# Patient Record
Sex: FEMALE | Race: WHITE | Employment: UNEMPLOYED | URBAN - METROPOLITAN AREA
[De-identification: names, ages, dates, MRNs, and addresses within clinical notes are randomized per-mention and may not be internally consistent; named-entity substitution may affect disease eponyms.]

---

## 2022-04-27 ENCOUNTER — OFFICE VISIT (OUTPATIENT)
Dept: PRIMARY CARE CLINIC | Age: 52
End: 2022-04-27
Payer: MEDICAID

## 2022-04-27 VITALS
SYSTOLIC BLOOD PRESSURE: 150 MMHG | BODY MASS INDEX: 40.35 KG/M2 | OXYGEN SATURATION: 99 % | HEIGHT: 67 IN | DIASTOLIC BLOOD PRESSURE: 88 MMHG | WEIGHT: 257.1 LBS | TEMPERATURE: 96.8 F | HEART RATE: 94 BPM

## 2022-04-27 DIAGNOSIS — H61.23 BILATERAL IMPACTED CERUMEN: ICD-10-CM

## 2022-04-27 DIAGNOSIS — Z00.8 ENCOUNTER FOR ELECTROCARDIOGRAM: ICD-10-CM

## 2022-04-27 DIAGNOSIS — H81.22 VESTIBULAR NEURONITIS OF LEFT EAR: ICD-10-CM

## 2022-04-27 DIAGNOSIS — Z12.11 COLON CANCER SCREENING: ICD-10-CM

## 2022-04-27 DIAGNOSIS — Z87.442 HISTORY OF NEPHROLITHIASIS: ICD-10-CM

## 2022-04-27 DIAGNOSIS — I10 BENIGN ESSENTIAL HTN: Primary | ICD-10-CM

## 2022-04-27 DIAGNOSIS — Z13.220 LIPID SCREENING: ICD-10-CM

## 2022-04-27 DIAGNOSIS — Z12.31 BREAST CANCER SCREENING BY MAMMOGRAM: ICD-10-CM

## 2022-04-27 PROBLEM — H61.20 CERUMEN IMPACTION: Status: ACTIVE | Noted: 2022-04-27

## 2022-04-27 PROBLEM — H81.20 VESTIBULAR NEURITIS: Status: ACTIVE | Noted: 2022-04-27

## 2022-04-27 PROCEDURE — G8417 CALC BMI ABV UP PARAM F/U: HCPCS | Performed by: INTERNAL MEDICINE

## 2022-04-27 PROCEDURE — 3017F COLORECTAL CA SCREEN DOC REV: CPT | Performed by: INTERNAL MEDICINE

## 2022-04-27 PROCEDURE — 1036F TOBACCO NON-USER: CPT | Performed by: INTERNAL MEDICINE

## 2022-04-27 PROCEDURE — 93000 ELECTROCARDIOGRAM COMPLETE: CPT | Performed by: INTERNAL MEDICINE

## 2022-04-27 PROCEDURE — G8427 DOCREV CUR MEDS BY ELIG CLIN: HCPCS | Performed by: INTERNAL MEDICINE

## 2022-04-27 PROCEDURE — 99205 OFFICE O/P NEW HI 60 MIN: CPT | Performed by: INTERNAL MEDICINE

## 2022-04-27 SDOH — ECONOMIC STABILITY: FOOD INSECURITY: WITHIN THE PAST 12 MONTHS, THE FOOD YOU BOUGHT JUST DIDN'T LAST AND YOU DIDN'T HAVE MONEY TO GET MORE.: NEVER TRUE

## 2022-04-27 SDOH — ECONOMIC STABILITY: FOOD INSECURITY: WITHIN THE PAST 12 MONTHS, YOU WORRIED THAT YOUR FOOD WOULD RUN OUT BEFORE YOU GOT MONEY TO BUY MORE.: NEVER TRUE

## 2022-04-27 ASSESSMENT — ANXIETY QUESTIONNAIRES
1. FEELING NERVOUS, ANXIOUS, OR ON EDGE: 0
GAD7 TOTAL SCORE: 0
6. BECOMING EASILY ANNOYED OR IRRITABLE: 0
5. BEING SO RESTLESS THAT IT IS HARD TO SIT STILL: 0
7. FEELING AFRAID AS IF SOMETHING AWFUL MIGHT HAPPEN: 0
IF YOU CHECKED OFF ANY PROBLEMS ON THIS QUESTIONNAIRE, HOW DIFFICULT HAVE THESE PROBLEMS MADE IT FOR YOU TO DO YOUR WORK, TAKE CARE OF THINGS AT HOME, OR GET ALONG WITH OTHER PEOPLE: NOT DIFFICULT AT ALL
4. TROUBLE RELAXING: 0
3. WORRYING TOO MUCH ABOUT DIFFERENT THINGS: 0
2. NOT BEING ABLE TO STOP OR CONTROL WORRYING: 0

## 2022-04-27 ASSESSMENT — PATIENT HEALTH QUESTIONNAIRE - PHQ9
SUM OF ALL RESPONSES TO PHQ QUESTIONS 1-9: 0
2. FEELING DOWN, DEPRESSED OR HOPELESS: 0
1. LITTLE INTEREST OR PLEASURE IN DOING THINGS: 0
SUM OF ALL RESPONSES TO PHQ QUESTIONS 1-9: 0
SUM OF ALL RESPONSES TO PHQ9 QUESTIONS 1 & 2: 0
SUM OF ALL RESPONSES TO PHQ QUESTIONS 1-9: 0
SUM OF ALL RESPONSES TO PHQ QUESTIONS 1-9: 0

## 2022-04-27 ASSESSMENT — SOCIAL DETERMINANTS OF HEALTH (SDOH): HOW HARD IS IT FOR YOU TO PAY FOR THE VERY BASICS LIKE FOOD, HOUSING, MEDICAL CARE, AND HEATING?: NOT HARD AT ALL

## 2022-04-27 NOTE — PROGRESS NOTES
HPI:  The patient is a 47 y.o. female who presents today to establish care. She has a past medical history of nephrolithiasis with a carotid calcium renal stones requiring lithotripsy sometimes over the life. She also had an episode of vestibular neuritis back in January where she ended up with a severe left ear infection and had to go through physical therapy. She feels a lot better now with complete resolution of that episode and at that time it was thought to be secondary to COVID infection but she was not tested at that time. The patient complains of bilateral ear itching for the last few weeks. On exam she had some cerumen that is dry on both sides and she was advised to use the kit over-the-counter and if no success we will flush it for her. She is actually seeing ENT in 2 weeks. She is up-to-date with her mammogram and pelvic exam she is seeing gynecology in a week  She also have a strong family history of coronary artery disease and colon cancer in her dad at the age of 76-77 and she has been doing her colonoscopy every 5 years. She denies any current shortness of breath chest pains abdominal pains nausea vomiting or diarrhea. She did not have any PCP and she used to go to the urgent care when she has an acute event. .  Blood pressure today is mildly elevated but we will recheck it again next visit to avoid white coat syndrome effect. Past Medical History:   Diagnosis Date    Benign essential HTN 4/27/2022    Kidney stone       Past Surgical History:   Procedure Laterality Date    APPENDECTOMY        No family history on file.   Social History     Socioeconomic History    Marital status: Single     Spouse name: Not on file    Number of children: Not on file    Years of education: Not on file    Highest education level: Not on file   Occupational History    Not on file   Tobacco Use    Smoking status: Never Smoker    Smokeless tobacco: Never Used   Vaping Use    Vaping Use: Never used Substance and Sexual Activity    Alcohol use: No    Drug use: No    Sexual activity: Never   Other Topics Concern    Not on file   Social History Narrative    Not on file     Social Determinants of Health     Financial Resource Strain: Low Risk     Difficulty of Paying Living Expenses: Not hard at all   Food Insecurity: No Food Insecurity    Worried About Running Out of Food in the Last Year: Never true    Kun of Food in the Last Year: Never true   Transportation Needs:     Lack of Transportation (Medical): Not on file    Lack of Transportation (Non-Medical):  Not on file   Physical Activity:     Days of Exercise per Week: Not on file    Minutes of Exercise per Session: Not on file   Stress:     Feeling of Stress : Not on file   Social Connections:     Frequency of Communication with Friends and Family: Not on file    Frequency of Social Gatherings with Friends and Family: Not on file    Attends Spiritism Services: Not on file    Active Member of [address] [place] [address] or Organizations: Not on file    Attends Club or Organization Meetings: Not on file    Marital Status: Not on file   Intimate Partner Violence:     Fear of Current or Ex-Partner: Not on file    Emotionally Abused: Not on file    Physically Abused: Not on file    Sexually Abused: Not on file   Housing Stability:     Unable to Pay for Housing in the Last Year: Not on file    Number of [place] in the Last Year: Not on file    Unstable Housing in the Last Year: Not on file      Allergies   Allergen Reactions    Medrol [Methylprednisolone] Rash    Sulfa Antibiotics Rash        Review of Systems:  Constitutional:  No fever, no fatigue, no chills, no headaches, no weight change  Dermatology:  No rash, no mole, no dry or sensitive skin  ENT:  No cough, no sore throat, no sinus pain, no runny nose, no ear pain  Cardiology:  No chest pain, no palpitations, no leg edema, no shortness of breath, no PND  Endocrinology:  No polydipsia, no polyuria, no cold intolerance, no heat intolerance, no polyphagia, no hair changes  Gastroenterology:  No dysphagia, no abdominal pain, no nausea, no vomiting, no constipation, no diarrhea, no heartburn  Female Reproductive:  No hot flashes, no abnormal vaginal discharge, no pain with menstruation, no pelvic pain  Musculoskeletal:  No joint pain, no leg cramps, no back pain, no muscle aches  Respiratory:  No shortness of breath, no orthopnea, no wheezing, no MOE, no hemoptysis  Urology:  No blood in the urine, no urinary frequency, no urinary incontinence, no urinary urgency, no nocturia, no dysuria  Neurology:  No numbness/tingling, no dizziness, no weakness  Psychology:  No depression, no sleep disturbances, no suicidal ideation, no anxiety      PHYSICAL EXAM:    Vitals:  BP (!) 150/88   Pulse 94   Temp 96.8 °F (36 °C) (Temporal)   Ht 5' 7\" (1.702 m)   Wt 257 lb 1.6 oz (116.6 kg)   SpO2 99%   Breastfeeding No   BMI 40.27 kg/m²     General:  Awake, alert, oriented X 3. Well developed, well nourished, well groomed. No apparent distress. HEENT:  Normocephalic, atraumatic. Pupils equal, round, reactive to light. No scleral icterus. No conjunctival injection. Normal lips, teeth, and gums. No nasal discharge. Neck:  Supple  Heart:  RRR, no murmurs, gallops, or rubs  Lungs:  CTA bilaterally, bilat symmetrical expansion, no wheeze, rales, or rhonchi  Abdomen: Bowel sounds present, soft, nontender, no masses, no organomegaly, no peritoneal signs  Extremities:  No clubbing, cyanosis, or edema  Skin:  Warm and dry, no open lesions or rash  Neuro:  Cranial nerves 2-12 intact, no focal deficits  Breast: symmetrical,  no masses, no skin lesions, no peu d'orange, no axillary lymph nodes, no nipple discharge  Rectal: no masses, GUIAC negative stools, positive hemorrhoids  Genitalia:  no vaginal discharge, no rashes, no inguinal lymph nodes    Assessment & Plan:  1.  Benign essential HTN  -     CBC with Auto Differential; Future  -     Comprehensive Metabolic Panel; Future  -     Urinalysis; Future  2. Encounter for electrocardiogram  -     EKG 12 Lead  3. History of nephrolithiasis  -     Vitamin D 25 Hydroxy; Future  -     PTH, Intact; Future  4. Vestibular neuronitis of left ear  -     Vitamin D 25 Hydroxy; Future  5. Bilateral impacted cerumen  6. Colon cancer screening  -     External Referral To Gastroenterology  7. Lipid screening  -     Lipid Panel; Future  -     TSH; Future  8. Breast cancer screening by mammogram  -     ROSEMARIE DIGITAL SCREEN W OR WO CAD BILATERAL; Future  It took about 60 minutes for face-to-face interview with the patient and examining her and going over all the necessary imaging and lab studies.   We will check her for hyperparathyroidism in the presence of the history of significant nephrolithiasis

## 2022-05-17 DIAGNOSIS — Z87.442 HISTORY OF NEPHROLITHIASIS: ICD-10-CM

## 2022-05-17 DIAGNOSIS — I10 BENIGN ESSENTIAL HTN: ICD-10-CM

## 2022-05-17 DIAGNOSIS — H81.22 VESTIBULAR NEURONITIS OF LEFT EAR: ICD-10-CM

## 2022-05-17 DIAGNOSIS — Z13.220 LIPID SCREENING: ICD-10-CM

## 2022-05-17 LAB
ALBUMIN SERPL-MCNC: 4.7 G/DL (ref 3.5–5.2)
ALP BLD-CCNC: 64 U/L (ref 35–104)
ALT SERPL-CCNC: 16 U/L (ref 0–32)
ANION GAP SERPL CALCULATED.3IONS-SCNC: 14 MMOL/L (ref 7–16)
AST SERPL-CCNC: 21 U/L (ref 0–31)
BACTERIA: NORMAL /HPF
BASOPHILS ABSOLUTE: 0.04 E9/L (ref 0–0.2)
BASOPHILS RELATIVE PERCENT: 0.6 % (ref 0–2)
BILIRUB SERPL-MCNC: 1.1 MG/DL (ref 0–1.2)
BILIRUBIN URINE: NEGATIVE
BLOOD, URINE: NEGATIVE
BUN BLDV-MCNC: 10 MG/DL (ref 6–20)
CALCIUM SERPL-MCNC: 10.5 MG/DL (ref 8.6–10.2)
CHLORIDE BLD-SCNC: 105 MMOL/L (ref 98–107)
CHOLESTEROL, TOTAL: 198 MG/DL (ref 0–199)
CLARITY: ABNORMAL
CO2: 23 MMOL/L (ref 22–29)
COLOR: YELLOW
CREAT SERPL-MCNC: 0.8 MG/DL (ref 0.5–1)
EOSINOPHILS ABSOLUTE: 0.19 E9/L (ref 0.05–0.5)
EOSINOPHILS RELATIVE PERCENT: 3 % (ref 0–6)
GFR AFRICAN AMERICAN: >60
GFR NON-AFRICAN AMERICAN: >60 ML/MIN/1.73
GLUCOSE BLD-MCNC: 98 MG/DL (ref 74–99)
GLUCOSE URINE: NEGATIVE MG/DL
HCT VFR BLD CALC: 47.4 % (ref 34–48)
HDLC SERPL-MCNC: 40 MG/DL
HEMOGLOBIN: 14.6 G/DL (ref 11.5–15.5)
IMMATURE GRANULOCYTES #: 0.02 E9/L
IMMATURE GRANULOCYTES %: 0.3 % (ref 0–5)
KETONES, URINE: NEGATIVE MG/DL
LDL CHOLESTEROL CALCULATED: 133 MG/DL (ref 0–99)
LEUKOCYTE ESTERASE, URINE: ABNORMAL
LYMPHOCYTES ABSOLUTE: 1.53 E9/L (ref 1.5–4)
LYMPHOCYTES RELATIVE PERCENT: 24.2 % (ref 20–42)
MCH RBC QN AUTO: 29.1 PG (ref 26–35)
MCHC RBC AUTO-ENTMCNC: 30.8 % (ref 32–34.5)
MCV RBC AUTO: 94.4 FL (ref 80–99.9)
MONOCYTES ABSOLUTE: 0.59 E9/L (ref 0.1–0.95)
MONOCYTES RELATIVE PERCENT: 9.3 % (ref 2–12)
NEUTROPHILS ABSOLUTE: 3.96 E9/L (ref 1.8–7.3)
NEUTROPHILS RELATIVE PERCENT: 62.6 % (ref 43–80)
NITRITE, URINE: NEGATIVE
PARATHYROID HORMONE INTACT: 74 PG/ML (ref 15–65)
PDW BLD-RTO: 13.8 FL (ref 11.5–15)
PH UA: 7 (ref 5–9)
PLATELET # BLD: 297 E9/L (ref 130–450)
PMV BLD AUTO: 11 FL (ref 7–12)
POTASSIUM SERPL-SCNC: 5 MMOL/L (ref 3.5–5)
PROTEIN UA: NEGATIVE MG/DL
RBC # BLD: 5.02 E12/L (ref 3.5–5.5)
RBC UA: NORMAL /HPF (ref 0–2)
SODIUM BLD-SCNC: 142 MMOL/L (ref 132–146)
SPECIFIC GRAVITY UA: 1.01 (ref 1–1.03)
TOTAL PROTEIN: 6.9 G/DL (ref 6.4–8.3)
TRIGL SERPL-MCNC: 123 MG/DL (ref 0–149)
TSH SERPL DL<=0.05 MIU/L-ACNC: 2.31 UIU/ML (ref 0.27–4.2)
UROBILINOGEN, URINE: 0.2 E.U./DL
VITAMIN D 25-HYDROXY: 10 NG/ML (ref 30–100)
VLDLC SERPL CALC-MCNC: 25 MG/DL
WBC # BLD: 6.3 E9/L (ref 4.5–11.5)
WBC UA: NORMAL /HPF (ref 0–5)

## 2022-05-18 LAB — PAP SMEAR, EXTERNAL: NORMAL

## 2022-05-20 ENCOUNTER — HOSPITAL ENCOUNTER (OUTPATIENT)
Dept: MAMMOGRAPHY | Age: 52
Discharge: HOME | End: 2022-05-22
Payer: MEDICAID

## 2022-05-20 VITALS — HEIGHT: 68 IN | WEIGHT: 240 LBS | BODY MASS INDEX: 36.37 KG/M2

## 2022-05-20 DIAGNOSIS — Z12.31 BREAST CANCER SCREENING BY MAMMOGRAM: ICD-10-CM

## 2022-05-20 PROCEDURE — 77063 BREAST TOMOSYNTHESIS BI: CPT

## 2022-05-25 ENCOUNTER — OFFICE VISIT (OUTPATIENT)
Dept: PRIMARY CARE CLINIC | Age: 52
End: 2022-05-25
Payer: MEDICAID

## 2022-05-25 VITALS
WEIGHT: 252.1 LBS | HEART RATE: 84 BPM | DIASTOLIC BLOOD PRESSURE: 78 MMHG | HEIGHT: 68 IN | SYSTOLIC BLOOD PRESSURE: 128 MMHG | OXYGEN SATURATION: 93 % | TEMPERATURE: 97.3 F | BODY MASS INDEX: 38.21 KG/M2

## 2022-05-25 DIAGNOSIS — H81.20 VESTIBULAR NEURONITIS, UNSPECIFIED LATERALITY: ICD-10-CM

## 2022-05-25 DIAGNOSIS — Z87.442 HISTORY OF NEPHROLITHIASIS: ICD-10-CM

## 2022-05-25 DIAGNOSIS — E34.9 ELEVATED PARATHYROID HORMONE: ICD-10-CM

## 2022-05-25 DIAGNOSIS — E83.52 HYPERCALCEMIA: ICD-10-CM

## 2022-05-25 DIAGNOSIS — E55.9 VITAMIN D DEFICIENCY: Primary | ICD-10-CM

## 2022-05-25 PROBLEM — R79.89 ELEVATED PARATHYROID HORMONE: Status: ACTIVE | Noted: 2022-05-25

## 2022-05-25 PROCEDURE — 99214 OFFICE O/P EST MOD 30 MIN: CPT | Performed by: INTERNAL MEDICINE

## 2022-05-25 PROCEDURE — G8427 DOCREV CUR MEDS BY ELIG CLIN: HCPCS | Performed by: INTERNAL MEDICINE

## 2022-05-25 PROCEDURE — 1036F TOBACCO NON-USER: CPT | Performed by: INTERNAL MEDICINE

## 2022-05-25 PROCEDURE — G8417 CALC BMI ABV UP PARAM F/U: HCPCS | Performed by: INTERNAL MEDICINE

## 2022-05-25 PROCEDURE — 3017F COLORECTAL CA SCREEN DOC REV: CPT | Performed by: INTERNAL MEDICINE

## 2022-05-25 RX ORDER — ERGOCALCIFEROL 1.25 MG/1
50000 CAPSULE ORAL WEEKLY
Qty: 12 CAPSULE | Refills: 1 | Status: SHIPPED
Start: 2022-05-25 | End: 2022-08-16 | Stop reason: SDUPTHER

## 2022-05-25 RX ORDER — NEOMYCIN SULFATE, POLYMYXIN B SULFATE AND DEXAMETHASONE 3.5; 10000; 1 MG/ML; [USP'U]/ML; MG/ML
SUSPENSION/ DROPS OPHTHALMIC
COMMUNITY
Start: 2022-05-09 | End: 2022-06-21

## 2022-05-25 NOTE — PROGRESS NOTES
Chief Complaint   Patient presents with   Pheedo [place]     Pt is here as a 1 month F/U, to discuss labs that were drawn and on file for review. This pt. also had her Mammogram  done on 5/20 and is scheduled for a Colonoscopy in July. HPI:  Patient is here for follow-up of nephrolithiasis and blood work that was ordered and mammogram.  She is doing very well without any new complaints. Blood work was discussed with patient. Mild hypercalcemia at 10.5 g. Also mild hyperparathyroidism but very decreased vitamin D level down to 10. Patient was advised to start vitamin D prescription supplement in an attempt to correct the abnormalities in the blood work. Also blood work showed mild hyperlipidemia with an LDL of 133 rest of lab work are within normal limits. She has been encouraged to increase her water intake. Screening mammogram was within normal limits. .    Past Medical History, Surgical History, and Family History has been reviewed and updated.     Review of Systems:  Constitutional:  No fever, no fatigue, no chills, no headaches, no weight change  Dermatology:  No rash, no mole, no dry or sensitive skin  ENT:  No cough, no sore throat, no sinus pain, no runny nose, no ear pain  Cardiology:  No chest pain, no palpitations, no leg edema, no shortness of breath, no PND  Gastroenterology:  No dysphagia, no abdominal pain, no nausea, no vomiting, no constipation, no diarrhea, no heartburn  Musculoskeletal:  No joint pain, no leg cramps, no back pain, no muscle aches  Respiratory:  No shortness of breath, no orthopnea, no wheezing, no MOE, no hemoptysis  Urology:  No blood in the urine, no urinary frequency, no urinary incontinence, no urinary urgency, no nocturia, no dysuria    Vitals:    05/25/22 1121   BP: 128/78   Pulse: 84   Temp: 97.3 °F (36.3 °C)   TempSrc: Temporal   SpO2: 93%   Weight: 252 lb 1.6 oz (114.4 kg)   Height: 5' 8\" (1.727 m)       General:  Patient alert and oriented x 3, NAD, pleasant  HEENT:  Atraumatic, normocephalic, PERRLA, EOMI, clear conjunctiva, TMs clear, nose-clear, throat - no erythema  Neck:  Supple, no goiter, no carotid bruits, no LAD  Lungs:  CTA   Heart:  RRR, no murmurs, gallops or rubs  Abdomen:  Soft/nt/nd, + bowel sounds  Extremities:  No clubbing, cyanosis or edema  Skin: unremarkable    Cholesterol, Total   Date Value Ref Range Status   05/17/2022 198 0 - 199 mg/dL Final     Triglycerides   Date Value Ref Range Status   05/17/2022 123 0 - 149 mg/dL Final     HDL   Date Value Ref Range Status   05/17/2022 40 >40 mg/dL Final     LDL Calculated   Date Value Ref Range Status   05/17/2022 133 (H) 0 - 99 mg/dL Final     VLDL Cholesterol Calculated   Date Value Ref Range Status   05/17/2022 25 mg/dL Final     Sodium   Date Value Ref Range Status   05/17/2022 142 132 - 146 mmol/L Final     Potassium   Date Value Ref Range Status   05/17/2022 5.0 3.5 - 5.0 mmol/L Final     Chloride   Date Value Ref Range Status   05/17/2022 105 98 - 107 mmol/L Final     CO2   Date Value Ref Range Status   05/17/2022 23 22 - 29 mmol/L Final     BUN   Date Value Ref Range Status   05/17/2022 10 6 - 20 mg/dL Final     CREATININE   Date Value Ref Range Status   05/17/2022 0.8 0.5 - 1.0 mg/dL Final     Glucose   Date Value Ref Range Status   05/17/2022 98 74 - 99 mg/dL Final     Calcium   Date Value Ref Range Status   05/17/2022 10.5 (H) 8.6 - 10.2 mg/dL Final     Total Protein   Date Value Ref Range Status   05/17/2022 6.9 6.4 - 8.3 g/dL Final     Albumin   Date Value Ref Range Status   05/17/2022 4.7 3.5 - 5.2 g/dL Final     Total Bilirubin   Date Value Ref Range Status   05/17/2022 1.1 0.0 - 1.2 mg/dL Final     Alkaline Phosphatase   Date Value Ref Range Status   05/17/2022 64 35 - 104 U/L Final     AST   Date Value Ref Range Status   05/17/2022 21 0 - 31 U/L Final     ALT   Date Value Ref Range Status   05/17/2022 16 0 - 32 U/L Final     GFR Non-   Date Value Ref Range Status 05/17/2022 >60 >=60 mL/min/1.73 Final     Comment:     Chronic Kidney Disease: less than 60 ml/min/1.73 sq.m. Kidney Failure: less than 15 ml/min/1.73 sq.m. Results valid for patients 18 years and older. GFR    Date Value Ref Range Status   05/17/2022 >60  Final        No results found. Assessment/Plan:      Outpatient Encounter Medications as of 5/25/2022   Medication Sig Dispense Refill    neomycin-polymyxin-dexameth (MAXITROL) 3.5-13563-8.1 ophthalmic suspension INSTILL 3 DROPS INTO EACH EAR TWICE DAILY FOR 10 DAYS      vitamin D (ERGOCALCIFEROL) 1.25 MG (00741 UT) CAPS capsule Take 1 capsule by mouth once a week 12 capsule 1     No facility-administered encounter medications on file as of 5/25/2022. Chelly Leger was seen today for discuss labs. Diagnoses and all orders for this visit:    Benign essential HTN  -     Basic Metabolic Panel; Future    History of nephrolithiasis  -     PTH, Intact; Future    Vestibular neuronitis, unspecified laterality    Hypercalcemia  -     vitamin D (ERGOCALCIFEROL) 1.25 MG (02519 UT) CAPS capsule; Take 1 capsule by mouth once a week  -     Basic Metabolic Panel; Future  -     Vitamin D 25 Hydroxy; Future  -     PTH, Intact; Future         There are no Patient Instructions on file for this visit. On this date 5/25/2022 I have spent 25 minutes reviewing previous notes, test results and face to face with the patient discussing the diagnosis and importance of compliance with the treatment plan as well as documenting on the day of the visit.       Cortney Seo MD   5/25/22

## 2022-06-21 ENCOUNTER — OFFICE VISIT (OUTPATIENT)
Dept: PRIMARY CARE CLINIC | Age: 52
End: 2022-06-21
Payer: MEDICAID

## 2022-06-21 ENCOUNTER — HOSPITAL ENCOUNTER (OUTPATIENT)
Age: 52
Discharge: HOME | End: 2022-06-23
Payer: MEDICAID

## 2022-06-21 ENCOUNTER — HOSPITAL ENCOUNTER (OUTPATIENT)
Dept: GENERAL RADIOLOGY | Age: 52
Discharge: HOME | End: 2022-06-23
Payer: MEDICAID

## 2022-06-21 VITALS
WEIGHT: 250.2 LBS | DIASTOLIC BLOOD PRESSURE: 80 MMHG | SYSTOLIC BLOOD PRESSURE: 122 MMHG | TEMPERATURE: 97 F | OXYGEN SATURATION: 99 % | BODY MASS INDEX: 37.92 KG/M2 | HEART RATE: 78 BPM | HEIGHT: 68 IN

## 2022-06-21 DIAGNOSIS — E55.9 VITAMIN D DEFICIENCY: ICD-10-CM

## 2022-06-21 DIAGNOSIS — Z87.442 HISTORY OF NEPHROLITHIASIS: ICD-10-CM

## 2022-06-21 DIAGNOSIS — N30.01 HEMATURIA DUE TO ACUTE CYSTITIS: ICD-10-CM

## 2022-06-21 DIAGNOSIS — N30.01 HEMATURIA DUE TO ACUTE CYSTITIS: Primary | ICD-10-CM

## 2022-06-21 LAB
BILIRUBIN, POC: NORMAL
BLOOD URINE, POC: NORMAL
CLARITY, POC: CLEAR
COLOR, POC: YELLOW
GLUCOSE URINE, POC: NORMAL
KETONES, POC: NORMAL
LEUKOCYTE EST, POC: NORMAL
NITRITE, POC: NORMAL
PH, POC: 6.5
PROTEIN, POC: NORMAL
SPECIFIC GRAVITY, POC: 1.02
UROBILINOGEN, POC: NORMAL

## 2022-06-21 PROCEDURE — 1036F TOBACCO NON-USER: CPT | Performed by: INTERNAL MEDICINE

## 2022-06-21 PROCEDURE — G8427 DOCREV CUR MEDS BY ELIG CLIN: HCPCS | Performed by: INTERNAL MEDICINE

## 2022-06-21 PROCEDURE — G8417 CALC BMI ABV UP PARAM F/U: HCPCS | Performed by: INTERNAL MEDICINE

## 2022-06-21 PROCEDURE — 74018 RADEX ABDOMEN 1 VIEW: CPT

## 2022-06-21 PROCEDURE — 99213 OFFICE O/P EST LOW 20 MIN: CPT | Performed by: INTERNAL MEDICINE

## 2022-06-21 PROCEDURE — 81002 URINALYSIS NONAUTO W/O SCOPE: CPT | Performed by: INTERNAL MEDICINE

## 2022-06-21 PROCEDURE — 3017F COLORECTAL CA SCREEN DOC REV: CPT | Performed by: INTERNAL MEDICINE

## 2022-06-21 RX ORDER — CIPROFLOXACIN 500 MG/1
500 TABLET, FILM COATED ORAL 2 TIMES DAILY
Qty: 14 TABLET | Refills: 0 | Status: SHIPPED | OUTPATIENT
Start: 2022-06-21 | End: 2022-06-28

## 2022-06-21 NOTE — PROGRESS NOTES
Chief Complaint   Patient presents with    Urinary Tract Infection     Pt is here as an acute visit with c/o UTI symptoms and a U/A obtained today. Pt has burning and dysuria x1 week, and states she is prone to kidney stones and also has low back discomfort. HPI:  Patient is here  complaining of left-sided flank pain radiated to the left lower abdominal quadrant. She has been having that for about 2 to 3 days getting progressively worse. She denies any fevers or chills any nausea or vomiting. She had a history of kidney stones and she has been seeing her urologist once a year in September. According to her last time she had to renal stones on the left side that were small in size and she does not know whether she passed them or not. Today her UA showed large blood with trace leukocyte gloria. She has been having some frequency of urination and dysuria lately. She did not have the blood work done yet but she has been compliant with vitamin D weekly. Past Medical History, Surgical History, and Family History has been reviewed and updated.     Review of Systems:  Constitutional:  No fever, no fatigue, no chills, no headaches, no weight change  Dermatology:  No rash, no mole, no dry or sensitive skin  ENT:  No cough, no sore throat, no sinus pain, no runny nose, no ear pain  Cardiology:  No chest pain, no palpitations, no leg edema, no shortness of breath, no PND  Gastroenterology:  No dysphagia, no abdominal pain, no nausea, no vomiting, no constipation, no diarrhea, no heartburn  Musculoskeletal:  No joint pain, no leg cramps, no back pain, no muscle aches  Respiratory:  No shortness of breath, no orthopnea, no wheezing, no MOE, no hemoptysis  Urology:  No blood in the urine, no urinary frequency, no urinary incontinence, no urinary urgency, no nocturia, no dysuria    Vitals:    06/21/22 1137   BP: 122/80   Pulse: 78   Temp: 97 °F (36.1 °C)   TempSrc: Temporal   SpO2: 99%   Weight: 250 lb 3.2 oz (113.5 kg)   Height: 5' 8\" (1.727 m)       General:  Patient alert and oriented x 3, NAD, pleasant  HEENT:  Atraumatic, normocephalic, PERRLA, EOMI, clear conjunctiva, TMs clear, nose-clear, throat - no erythema  Neck:  Supple, no goiter, no carotid bruits, no LAD  Lungs:  CTA   Heart:  RRR, no murmurs, gallops or rubs  Abdomen:  Soft/nt/nd, + bowel sounds, mild suprapubic discomfort  Left flank: Positive mild tenderness radiating to the left lower abdominal area  Extremities:  No clubbing, cyanosis or edema  Skin: unremarkable    Cholesterol, Total   Date Value Ref Range Status   05/17/2022 198 0 - 199 mg/dL Final     Triglycerides   Date Value Ref Range Status   05/17/2022 123 0 - 149 mg/dL Final     HDL   Date Value Ref Range Status   05/17/2022 40 >40 mg/dL Final     LDL Calculated   Date Value Ref Range Status   05/17/2022 133 (H) 0 - 99 mg/dL Final     VLDL Cholesterol Calculated   Date Value Ref Range Status   05/17/2022 25 mg/dL Final     Sodium   Date Value Ref Range Status   05/17/2022 142 132 - 146 mmol/L Final     Potassium   Date Value Ref Range Status   05/17/2022 5.0 3.5 - 5.0 mmol/L Final     Chloride   Date Value Ref Range Status   05/17/2022 105 98 - 107 mmol/L Final     CO2   Date Value Ref Range Status   05/17/2022 23 22 - 29 mmol/L Final     BUN   Date Value Ref Range Status   05/17/2022 10 6 - 20 mg/dL Final     CREATININE   Date Value Ref Range Status   05/17/2022 0.8 0.5 - 1.0 mg/dL Final     Glucose   Date Value Ref Range Status   05/17/2022 98 74 - 99 mg/dL Final     Calcium   Date Value Ref Range Status   05/17/2022 10.5 (H) 8.6 - 10.2 mg/dL Final     Total Protein   Date Value Ref Range Status   05/17/2022 6.9 6.4 - 8.3 g/dL Final     Albumin   Date Value Ref Range Status   05/17/2022 4.7 3.5 - 5.2 g/dL Final     Total Bilirubin   Date Value Ref Range Status   05/17/2022 1.1 0.0 - 1.2 mg/dL Final     Alkaline Phosphatase   Date Value Ref Range Status   05/17/2022 64 35 - 104 U/L Final     AST Date Value Ref Range Status   05/17/2022 21 0 - 31 U/L Final     ALT   Date Value Ref Range Status   05/17/2022 16 0 - 32 U/L Final     GFR Non-   Date Value Ref Range Status   05/17/2022 >60 >=60 mL/min/1.73 Final     Comment:     Chronic Kidney Disease: less than 60 ml/min/1.73 sq.m. Kidney Failure: less than 15 ml/min/1.73 sq.m. Results valid for patients 18 years and older. GFR    Date Value Ref Range Status   05/17/2022 >60  Final        No results found. Assessment/Plan:      Outpatient Encounter Medications as of 6/21/2022   Medication Sig Dispense Refill    ciprofloxacin (CIPRO) 500 MG tablet Take 1 tablet by mouth 2 times daily for 7 days 14 tablet 0    vitamin D (ERGOCALCIFEROL) 1.25 MG (97698 UT) CAPS capsule Take 1 capsule by mouth once a week 12 capsule 1    [DISCONTINUED] neomycin-polymyxin-dexameth (MAXITROL) 3.5-67237-3.1 ophthalmic suspension INSTILL 3 DROPS INTO EACH EAR TWICE DAILY FOR 10 DAYS       No facility-administered encounter medications on file as of 6/21/2022. Crispin Quarles was seen today for urinary tract infection. Diagnoses and all orders for this visit:    Hematuria due to acute cystitis  -     ciprofloxacin (CIPRO) 500 MG tablet; Take 1 tablet by mouth 2 times daily for 7 days  -     XR ABDOMEN (KUB) (SINGLE AP VIEW); Future    Vitamin D deficiency    History of nephrolithiasis  -     POCT Urinalysis no Micro         There are no Patient Instructions on file for this visit. On this date 6/21/2022 I have spent 25 minutes reviewing previous notes, test results and face to face with the patient discussing the diagnosis and importance of compliance with the treatment plan as well as documenting on the day of the visit.       Justin Hollingsworth MD   6/21/22

## 2022-06-29 DIAGNOSIS — Z87.442 HISTORY OF NEPHROLITHIASIS: ICD-10-CM

## 2022-06-29 DIAGNOSIS — E83.52 HYPERCALCEMIA: ICD-10-CM

## 2022-06-29 LAB
ANION GAP SERPL CALCULATED.3IONS-SCNC: 10 MMOL/L (ref 7–16)
BUN BLDV-MCNC: 11 MG/DL (ref 6–20)
CALCIUM SERPL-MCNC: 9.7 MG/DL (ref 8.6–10.2)
CHLORIDE BLD-SCNC: 105 MMOL/L (ref 98–107)
CO2: 25 MMOL/L (ref 22–29)
CREAT SERPL-MCNC: 0.7 MG/DL (ref 0.5–1)
GFR AFRICAN AMERICAN: >60
GFR NON-AFRICAN AMERICAN: >60 ML/MIN/1.73
GLUCOSE BLD-MCNC: 99 MG/DL (ref 74–99)
PARATHYROID HORMONE INTACT: 101 PG/ML (ref 15–65)
POTASSIUM SERPL-SCNC: 4.1 MMOL/L (ref 3.5–5)
SODIUM BLD-SCNC: 140 MMOL/L (ref 132–146)
VITAMIN D 25-HYDROXY: 16 NG/ML (ref 30–100)

## 2022-07-05 ENCOUNTER — OFFICE VISIT (OUTPATIENT)
Dept: PRIMARY CARE CLINIC | Age: 52
End: 2022-07-05
Payer: MEDICAID

## 2022-07-05 VITALS
DIASTOLIC BLOOD PRESSURE: 78 MMHG | HEART RATE: 78 BPM | HEIGHT: 68 IN | BODY MASS INDEX: 37.47 KG/M2 | TEMPERATURE: 96.9 F | WEIGHT: 247.2 LBS | SYSTOLIC BLOOD PRESSURE: 120 MMHG | OXYGEN SATURATION: 91 %

## 2022-07-05 DIAGNOSIS — E34.9 ELEVATED PARATHYROID HORMONE: ICD-10-CM

## 2022-07-05 DIAGNOSIS — Z87.442 HISTORY OF NEPHROLITHIASIS: ICD-10-CM

## 2022-07-05 DIAGNOSIS — E55.9 VITAMIN D DEFICIENCY: Primary | ICD-10-CM

## 2022-07-05 PROCEDURE — G8427 DOCREV CUR MEDS BY ELIG CLIN: HCPCS | Performed by: INTERNAL MEDICINE

## 2022-07-05 PROCEDURE — G8417 CALC BMI ABV UP PARAM F/U: HCPCS | Performed by: INTERNAL MEDICINE

## 2022-07-05 PROCEDURE — 99213 OFFICE O/P EST LOW 20 MIN: CPT | Performed by: INTERNAL MEDICINE

## 2022-07-05 PROCEDURE — 1036F TOBACCO NON-USER: CPT | Performed by: INTERNAL MEDICINE

## 2022-07-05 PROCEDURE — 3017F COLORECTAL CA SCREEN DOC REV: CPT | Performed by: INTERNAL MEDICINE

## 2022-07-05 RX ORDER — OMEPRAZOLE 40 MG/1
40 CAPSULE, DELAYED RELEASE ORAL
Qty: 30 CAPSULE | Refills: 1 | Status: SHIPPED
Start: 2022-07-05 | End: 2022-08-24

## 2022-07-05 NOTE — PROGRESS NOTES
Chief Complaint   Patient presents with    2 Week Follow-Up     Pt is here as a 2 week F/U, for Hematuria VS. Stones and states she did pass 1 stone, and there is still 1 present on the right. Labs on file from 6/29 for review. HPI:  Patient is here for follow-up of acute cystitis with hematuria and KUB. Abdominal x-ray showed bilateral renal stones that are tiny about 4 mm on the right side and another 1 on the left side. Pain had subsided on the left side after using the antibiotics but she is feeling a little bit of pressure on the right side now. She has a history of nephrolithiasis for all her life according to her she passed about 25 of them between lithotripsy and passing them in the urine. She is following up with urology every year. Blood work showed elevated PTH at 101 with low vitamin D at 16. She is on vitamin D supplements every week and her liver increased from 10-16. She was advised to continue the vitamin D and we will recheck parathyroid hormone later on  Colonoscopy scheduled at the end of the month. .    Past Medical History, Surgical History, and Family History has been reviewed and updated.     Review of Systems:  Constitutional:  No fever, no fatigue, no chills, no headaches, no weight change  Dermatology:  No rash, no mole, no dry or sensitive skin  ENT:  No cough, no sore throat, no sinus pain, no runny nose, no ear pain  Cardiology:  No chest pain, no palpitations, no leg edema, no shortness of breath, no PND  Gastroenterology:  No dysphagia, no abdominal pain, no nausea, no vomiting, no constipation, no diarrhea, no heartburn  Musculoskeletal:  No joint pain, no leg cramps, no back pain, no muscle aches  Respiratory:  No shortness of breath, no orthopnea, no wheezing, no MOE, no hemoptysis  Urology:  No blood in the urine, no urinary frequency, no urinary incontinence, no urinary urgency, no nocturia, no dysuria    Vitals:    07/05/22 1022   BP: 120/78   Pulse: 78   Temp: 96.9 °F (36.1 °C)   TempSrc: Temporal   SpO2: 91%   Weight: 247 lb 3.2 oz (112.1 kg)   Height: 5' 8\" (1.727 m)       General:  Patient alert and oriented x 3, NAD, pleasant  HEENT:  Atraumatic, normocephalic, PERRLA, EOMI, clear conjunctiva, TMs clear, nose-clear, throat - no erythema  Neck:  Supple, no goiter, no carotid bruits, no LAD  Lungs:  CTA   Heart:  RRR, no murmurs, gallops or rubs  Abdomen:  Soft/nt/nd, + bowel sounds mild discomfort on the right flank  Extremities:  No clubbing, cyanosis or edema  Skin: unremarkable    Cholesterol, Total   Date Value Ref Range Status   05/17/2022 198 0 - 199 mg/dL Final     Triglycerides   Date Value Ref Range Status   05/17/2022 123 0 - 149 mg/dL Final     HDL   Date Value Ref Range Status   05/17/2022 40 >40 mg/dL Final     LDL Calculated   Date Value Ref Range Status   05/17/2022 133 (H) 0 - 99 mg/dL Final     VLDL Cholesterol Calculated   Date Value Ref Range Status   05/17/2022 25 mg/dL Final     Sodium   Date Value Ref Range Status   06/29/2022 140 132 - 146 mmol/L Final     Potassium   Date Value Ref Range Status   06/29/2022 4.1 3.5 - 5.0 mmol/L Final     Chloride   Date Value Ref Range Status   06/29/2022 105 98 - 107 mmol/L Final     CO2   Date Value Ref Range Status   06/29/2022 25 22 - 29 mmol/L Final     BUN   Date Value Ref Range Status   06/29/2022 11 6 - 20 mg/dL Final     CREATININE   Date Value Ref Range Status   06/29/2022 0.7 0.5 - 1.0 mg/dL Final     Glucose   Date Value Ref Range Status   06/29/2022 99 74 - 99 mg/dL Final     Calcium   Date Value Ref Range Status   06/29/2022 9.7 8.6 - 10.2 mg/dL Final     Total Protein   Date Value Ref Range Status   05/17/2022 6.9 6.4 - 8.3 g/dL Final     Albumin   Date Value Ref Range Status   05/17/2022 4.7 3.5 - 5.2 g/dL Final     Total Bilirubin   Date Value Ref Range Status   05/17/2022 1.1 0.0 - 1.2 mg/dL Final     Alkaline Phosphatase   Date Value Ref Range Status   05/17/2022 64 35 - 104 U/L Final     AST Date Value Ref Range Status   05/17/2022 21 0 - 31 U/L Final     ALT   Date Value Ref Range Status   05/17/2022 16 0 - 32 U/L Final     GFR Non-   Date Value Ref Range Status   06/29/2022 >60 >=60 mL/min/1.73 Final     Comment:     Chronic Kidney Disease: less than 60 ml/min/1.73 sq.m. Kidney Failure: less than 15 ml/min/1.73 sq.m. Results valid for patients 18 years and older. GFR    Date Value Ref Range Status   06/29/2022 >60  Final        XR ABDOMEN (KUB) (SINGLE AP VIEW)    Result Date: 6/21/2022  EXAMINATION: ONE SUPINE XRAY VIEW(S) OF THE ABDOMEN 6/21/2022 12:11 pm COMPARISON: 30 March 2012 HISTORY: ORDERING SYSTEM PROVIDED HISTORY: Hematuria due to acute cystitis FINDINGS: 4 mm possible right renal stone better appreciated in the upright view than in the supine view. A tiny left renal stone is suggested as well. No free air, bowel wall pneumatosis or dilated bowel. No abnormal accumulation of fecal material within the lower GI tract. A reverse S thoracolumbar scoliosis is noted. Suspected nephrolithiasis bilaterally. Assessment/Plan:      Outpatient Encounter Medications as of 7/5/2022   Medication Sig Dispense Refill    omeprazole (PRILOSEC) 40 MG delayed release capsule Take 1 capsule by mouth every morning (before breakfast) 30 capsule 1    vitamin D (ERGOCALCIFEROL) 1.25 MG (90829 UT) CAPS capsule Take 1 capsule by mouth once a week 12 capsule 1     No facility-administered encounter medications on file as of 7/5/2022. Momo Sheldon was seen today for 2 week follow-up. Diagnoses and all orders for this visit:    Vitamin D deficiency  -     Vitamin D 25 Hydroxy; Future  -     Basic Metabolic Panel; Future    Elevated parathyroid hormone    History of nephrolithiasis    Other orders  -     omeprazole (PRILOSEC) 40 MG delayed release capsule;  Take 1 capsule by mouth every morning (before breakfast)         There are no Patient Instructions on file for this visit. On this date 7/5/2022 I have spent 25 minutes reviewing previous notes, test results and face to face with the patient discussing the diagnosis and importance of compliance with the treatment plan as well as documenting on the day of the visit.       Eliud Spear MD   7/5/22

## 2022-08-01 DIAGNOSIS — E55.9 VITAMIN D DEFICIENCY: ICD-10-CM

## 2022-08-01 LAB
ANION GAP SERPL CALCULATED.3IONS-SCNC: 10 MMOL/L (ref 7–16)
BUN BLDV-MCNC: 12 MG/DL (ref 6–20)
CALCIUM SERPL-MCNC: 10 MG/DL (ref 8.6–10.2)
CHLORIDE BLD-SCNC: 106 MMOL/L (ref 98–107)
CO2: 25 MMOL/L (ref 22–29)
CREAT SERPL-MCNC: 0.8 MG/DL (ref 0.5–1)
GFR AFRICAN AMERICAN: >60
GFR NON-AFRICAN AMERICAN: >60 ML/MIN/1.73
GLUCOSE BLD-MCNC: 97 MG/DL (ref 74–99)
POTASSIUM SERPL-SCNC: 4.6 MMOL/L (ref 3.5–5)
SODIUM BLD-SCNC: 141 MMOL/L (ref 132–146)
VITAMIN D 25-HYDROXY: 19 NG/ML (ref 30–100)

## 2022-08-16 DIAGNOSIS — E83.52 HYPERCALCEMIA: ICD-10-CM

## 2022-08-16 RX ORDER — ERGOCALCIFEROL 1.25 MG/1
50000 CAPSULE ORAL WEEKLY
Qty: 12 CAPSULE | Refills: 1 | Status: SHIPPED | OUTPATIENT
Start: 2022-08-16

## 2022-08-24 ENCOUNTER — OFFICE VISIT (OUTPATIENT)
Dept: PRIMARY CARE CLINIC | Age: 52
End: 2022-08-24
Payer: MEDICAID

## 2022-08-24 VITALS
TEMPERATURE: 97.9 F | HEIGHT: 68 IN | BODY MASS INDEX: 37.38 KG/M2 | WEIGHT: 246.6 LBS | SYSTOLIC BLOOD PRESSURE: 120 MMHG | HEART RATE: 85 BPM | OXYGEN SATURATION: 93 % | DIASTOLIC BLOOD PRESSURE: 76 MMHG

## 2022-08-24 DIAGNOSIS — E55.9 VITAMIN D DEFICIENCY: Primary | ICD-10-CM

## 2022-08-24 DIAGNOSIS — E34.9 ELEVATED PARATHYROID HORMONE: ICD-10-CM

## 2022-08-24 DIAGNOSIS — Z11.59 NEED FOR HEPATITIS C SCREENING TEST: ICD-10-CM

## 2022-08-24 PROCEDURE — 1036F TOBACCO NON-USER: CPT | Performed by: INTERNAL MEDICINE

## 2022-08-24 PROCEDURE — 3017F COLORECTAL CA SCREEN DOC REV: CPT | Performed by: INTERNAL MEDICINE

## 2022-08-24 PROCEDURE — G8427 DOCREV CUR MEDS BY ELIG CLIN: HCPCS | Performed by: INTERNAL MEDICINE

## 2022-08-24 PROCEDURE — 99213 OFFICE O/P EST LOW 20 MIN: CPT | Performed by: INTERNAL MEDICINE

## 2022-08-24 PROCEDURE — G8417 CALC BMI ABV UP PARAM F/U: HCPCS | Performed by: INTERNAL MEDICINE

## 2022-08-24 NOTE — PROGRESS NOTES
Chief Complaint   Patient presents with    3 Month Follow-Up     Pt is here as a routine visit, and is c/o some itching in her ears bilat. Pt has seen ENT and was given ear drops that do help. Labs drawn on 8/1 and on file for review. Colonoscopy completed last week, at [facility]. HPI:  Patient is here for follow-up of vitamin D deficiency and hyperparathyroidism. She is doing very well without any new complaints. She has not passed the other kidney stone on the right side yet. She denies any current abdominal pain nausea or vomiting or any bleeding or hematuria. She has been compliant with medications. Blood work was discussed with patient vitamin D is up to 19. She will continue the prescription weekly vitamin D dosing and we will recheck level in 3-month with the PTH. Patient complains of itching in bilateral ears worse on the left side and on exam she has a little bit of cerumen and scaly debris . Past Medical History, Surgical History, and Family History has been reviewed and updated.     Review of Systems:  Constitutional:  No fever, no fatigue, no chills, no headaches, no weight change  Dermatology:  No rash, no mole, no dry or sensitive skin  ENT:  No cough, no sore throat, no sinus pain, no runny nose, no ear pain  Cardiology:  No chest pain, no palpitations, no leg edema, no shortness of breath, no PND  Gastroenterology:  No dysphagia, no abdominal pain, no nausea, no vomiting, no constipation, no diarrhea, no heartburn  Musculoskeletal:  No joint pain, no leg cramps, no back pain, no muscle aches  Respiratory:  No shortness of breath, no orthopnea, no wheezing, no MOE, no hemoptysis  Urology:  No blood in the urine, no urinary frequency, no urinary incontinence, no urinary urgency, no nocturia, no dysuria    Vitals:    08/24/22 0904   BP: 120/76   Pulse: 85   Temp: 97.9 °F (36.6 °C)   TempSrc: Temporal   SpO2: 93%   Weight: 246 lb 9.6 oz (111.9 kg)   Height: 5' 8\" (1.727 m) General:  Patient alert and oriented x 3, NAD, pleasant  HEENT:  Atraumatic, normocephalic, PERRLA, EOMI, clear conjunctiva, TMs clear on the right but on the left side with some cerumen and flaky debris is on the your canal, nose-clear, throat - no erythema  Neck:  Supple, no goiter, no carotid bruits, no LAD  Lungs:  CTA   Heart:  RRR, no murmurs, gallops or rubs  Abdomen:  Soft/nt/nd, + bowel sounds  Extremities:  No clubbing, cyanosis or edema  Skin: unremarkable    Cholesterol, Total   Date Value Ref Range Status   05/17/2022 198 0 - 199 mg/dL Final     Triglycerides   Date Value Ref Range Status   05/17/2022 123 0 - 149 mg/dL Final     HDL   Date Value Ref Range Status   05/17/2022 40 >40 mg/dL Final     LDL Calculated   Date Value Ref Range Status   05/17/2022 133 (H) 0 - 99 mg/dL Final     VLDL Cholesterol Calculated   Date Value Ref Range Status   05/17/2022 25 mg/dL Final     Sodium   Date Value Ref Range Status   08/01/2022 141 132 - 146 mmol/L Final     Potassium   Date Value Ref Range Status   08/01/2022 4.6 3.5 - 5.0 mmol/L Final     Chloride   Date Value Ref Range Status   08/01/2022 106 98 - 107 mmol/L Final     CO2   Date Value Ref Range Status   08/01/2022 25 22 - 29 mmol/L Final     BUN   Date Value Ref Range Status   08/01/2022 12 6 - 20 mg/dL Final     Creatinine   Date Value Ref Range Status   08/01/2022 0.8 0.5 - 1.0 mg/dL Final     Glucose   Date Value Ref Range Status   08/01/2022 97 74 - 99 mg/dL Final     Calcium   Date Value Ref Range Status   08/01/2022 10.0 8.6 - 10.2 mg/dL Final     Total Protein   Date Value Ref Range Status   05/17/2022 6.9 6.4 - 8.3 g/dL Final     Albumin   Date Value Ref Range Status   05/17/2022 4.7 3.5 - 5.2 g/dL Final     Total Bilirubin   Date Value Ref Range Status   05/17/2022 1.1 0.0 - 1.2 mg/dL Final     Alkaline Phosphatase   Date Value Ref Range Status   05/17/2022 64 35 - 104 U/L Final     AST   Date Value Ref Range Status   05/17/2022 21 0 - 31 U/L Final     ALT   Date Value Ref Range Status   05/17/2022 16 0 - 32 U/L Final     GFR Non-   Date Value Ref Range Status   08/01/2022 >60 >=60 mL/min/1.73 Final     Comment:     Chronic Kidney Disease: less than 60 ml/min/1.73 sq.m. Kidney Failure: less than 15 ml/min/1.73 sq.m. Results valid for patients 18 years and older. GFR    Date Value Ref Range Status   08/01/2022 >60  Final        No results found. Assessment/Plan:      Outpatient Encounter Medications as of 8/24/2022   Medication Sig Dispense Refill    vitamin D (ERGOCALCIFEROL) 1.25 MG (10505 UT) CAPS capsule Take 1 capsule by mouth once a week 12 capsule 1    [DISCONTINUED] omeprazole (PRILOSEC) 40 MG delayed release capsule Take 1 capsule by mouth every morning (before breakfast) (Patient not taking: Reported on 8/24/2022) 30 capsule 1     No facility-administered encounter medications on file as of 8/24/2022. Rell Santana was seen today for 3 month follow-up. Diagnoses and all orders for this visit:    Vitamin D deficiency  -     Vitamin D 25 Hydroxy; Future    Elevated parathyroid hormone  -     PTH, Intact; Future    Need for hepatitis C screening test  -     Hepatitis C Antibody; Future         There are no Patient Instructions on file for this visit. On this date 8/24/2022 I have spent 25 minutes reviewing previous notes, test results and face to face with the patient discussing the diagnosis and importance of compliance with the treatment plan as well as documenting on the day of the visit.       Javy Srivastaav MD   8/24/22

## 2022-12-06 DIAGNOSIS — Z11.59 NEED FOR HEPATITIS C SCREENING TEST: ICD-10-CM

## 2022-12-06 DIAGNOSIS — E34.9 ELEVATED PARATHYROID HORMONE: ICD-10-CM

## 2022-12-06 DIAGNOSIS — E55.9 VITAMIN D DEFICIENCY: ICD-10-CM

## 2022-12-07 LAB
PARATHYROID HORMONE INTACT: 91 PG/ML (ref 15–65)
VITAMIN D 25-HYDROXY: 21 NG/ML (ref 30–100)

## 2022-12-09 LAB — HEPATITIS C ANTIBODY INTERPRETATION: NORMAL

## 2022-12-21 ENCOUNTER — SCHEDULED TELEPHONE ENCOUNTER (OUTPATIENT)
Dept: PRIMARY CARE CLINIC | Age: 52
End: 2022-12-21

## 2022-12-21 DIAGNOSIS — E34.9 ELEVATED PARATHYROID HORMONE: Primary | ICD-10-CM

## 2022-12-21 DIAGNOSIS — E55.9 VITAMIN D DEFICIENCY: ICD-10-CM

## 2022-12-21 DIAGNOSIS — E83.52 HYPERCALCEMIA: ICD-10-CM

## 2022-12-21 DIAGNOSIS — Z87.442 HISTORY OF NEPHROLITHIASIS: ICD-10-CM

## 2022-12-21 RX ORDER — ERGOCALCIFEROL 1.25 MG/1
50000 CAPSULE ORAL WEEKLY
Qty: 12 CAPSULE | Refills: 1 | Status: SHIPPED | OUTPATIENT
Start: 2022-12-21

## 2022-12-21 NOTE — PROGRESS NOTES
Chief Complaint   Patient presents with    3 Month Follow-Up     Telephone visit today as a routine F/U. Discuss Labs     Labs drawn on 12/6 and available for review with patient. This is a virtual/telephone visit  HPI:  Patient is for follow-up of hyperparathyroidism hypovitaminosis D and nephrolithiasis. Patient is doing well denying any current problems. She has been compliant with the vitamin D 50,000 units every week. Blood work was discussed with patient. Vitamin D is now up to 21 which is improved from where we started back in May when it was 10. PTH though is still elevated at 91. Patient denies any signs or symptoms of hypercalcemia and her last calcium level back in August was within normal limits. She has been having a lot of issues with nephrolithiasis about 2 years ago but over the course of the last year this is slowed down according to her. Patient was counseled and reassured. We will continue prescription strength vitamin D and we will recheck PTH, calcium and phosphorus level. We will also check a parathyroid ultrasound. Past Medical History, Surgical History, and Family History has been reviewed and updated.     Review of Systems:  Constitutional:  No fever, no fatigue, no chills, no headaches, no weight change  Dermatology:  No rash, no mole, no dry or sensitive skin  ENT:  No cough, no sore throat, no sinus pain, no runny nose, no ear pain  Cardiology:  No chest pain, no palpitations, no leg edema, no shortness of breath, no PND  Gastroenterology:  No dysphagia, no abdominal pain, no nausea, no vomiting, no constipation, no diarrhea, no heartburn  Musculoskeletal:  No joint pain, no leg cramps, no back pain, no muscle aches  Respiratory:  No shortness of breath, no orthopnea, no wheezing, no MOE, no hemoptysis  Urology:  No blood in the urine, no urinary frequency, no urinary incontinence, no urinary urgency, no nocturia, no dysuria    Patient-Reported Vitals 12/21/2022 Patient-Reported Weight 246lb   Patient-Reported Height 5\"8         General:  Patient alert and oriented x 3, NAD, pleasant      Cholesterol, Total   Date Value Ref Range Status   05/17/2022 198 0 - 199 mg/dL Final     Triglycerides   Date Value Ref Range Status   05/17/2022 123 0 - 149 mg/dL Final     HDL   Date Value Ref Range Status   05/17/2022 40 >40 mg/dL Final     VLDL Cholesterol Calculated   Date Value Ref Range Status   05/17/2022 25 mg/dL Final     Sodium   Date Value Ref Range Status   08/01/2022 141 132 - 146 mmol/L Final     Potassium   Date Value Ref Range Status   08/01/2022 4.6 3.5 - 5.0 mmol/L Final     Chloride   Date Value Ref Range Status   08/01/2022 106 98 - 107 mmol/L Final     CO2   Date Value Ref Range Status   08/01/2022 25 22 - 29 mmol/L Final     BUN   Date Value Ref Range Status   08/01/2022 12 6 - 20 mg/dL Final     Creatinine   Date Value Ref Range Status   08/01/2022 0.8 0.5 - 1.0 mg/dL Final     Glucose   Date Value Ref Range Status   08/01/2022 97 74 - 99 mg/dL Final     Calcium   Date Value Ref Range Status   08/01/2022 10.0 8.6 - 10.2 mg/dL Final     Total Protein   Date Value Ref Range Status   05/17/2022 6.9 6.4 - 8.3 g/dL Final     Albumin   Date Value Ref Range Status   05/17/2022 4.7 3.5 - 5.2 g/dL Final     Total Bilirubin   Date Value Ref Range Status   05/17/2022 1.1 0.0 - 1.2 mg/dL Final     Alkaline Phosphatase   Date Value Ref Range Status   05/17/2022 64 35 - 104 U/L Final     AST   Date Value Ref Range Status   05/17/2022 21 0 - 31 U/L Final     ALT   Date Value Ref Range Status   05/17/2022 16 0 - 32 U/L Final     GFR Non-   Date Value Ref Range Status   08/01/2022 >60 >=60 mL/min/1.73 Final     Comment:     Chronic Kidney Disease: less than 60 ml/min/1.73 sq.m. Kidney Failure: less than 15 ml/min/1.73 sq.m. Results valid for patients 18 years and older.        GFR    Date Value Ref Range Status   08/01/2022 >60  Final No results found. Assessment/Plan:      Outpatient Encounter Medications as of 12/21/2022   Medication Sig Dispense Refill    vitamin D (ERGOCALCIFEROL) 1.25 MG (54457 UT) CAPS capsule Take 1 capsule by mouth once a week 12 capsule 1    [DISCONTINUED] vitamin D (ERGOCALCIFEROL) 1.25 MG (22322 UT) CAPS capsule Take 1 capsule by mouth once a week 12 capsule 1     No facility-administered encounter medications on file as of 12/21/2022. Willis Burgess was seen today for 3 month follow-up and discuss labs. Diagnoses and all orders for this visit:    Elevated parathyroid hormone  -     PTH, Intact; Future  -     Phosphorus; Future  -     US, HEAD/NECK TISSUES,REAL TIME    Hypercalcemia  -     Comprehensive Metabolic Panel; Future  -     PTH, Intact; Future  -     vitamin D (ERGOCALCIFEROL) 1.25 MG (27900 UT) CAPS capsule; Take 1 capsule by mouth once a week    Vitamin D deficiency  -     Vitamin D 25 Hydroxy; Future  -     PTH, Intact; Future  -     vitamin D (ERGOCALCIFEROL) 1.25 MG (39847 UT) CAPS capsule; Take 1 capsule by mouth once a week    History of nephrolithiasis         There are no Patient Instructions on file for this visit. On this date 12/21/2022 I have spent 20 minutes reviewing previous notes, test results and face to face with the patient discussing the diagnosis and importance of compliance with the treatment plan as well as documenting on the day of the visit. Dominik Horton, was evaluated through a synchronous (real-time) audio-video encounter. The patient (or guardian if applicable) is aware that this is a billable service. Verbal consent to proceed has been obtained within the past 12 months. The visit was conducted pursuant to the emergency declaration under the [facility], [address] [place] [address] authority and the [employer].   Patient identification was verified, and a caregiver was present when appropriate. The patient was located in a state where the provider was credentialed to provide care. --Fidel Beckett MD on 12/21/2022 at 9:23 AM    An electronic signature was used to authenticate this note.

## 2023-02-28 ENCOUNTER — TELEPHONE (OUTPATIENT)
Dept: PRIMARY CARE CLINIC | Age: 53
End: 2023-02-28

## 2023-02-28 DIAGNOSIS — Z12.31 ENCOUNTER FOR SCREENING MAMMOGRAM FOR MALIGNANT NEOPLASM OF BREAST: Primary | ICD-10-CM

## 2023-02-28 NOTE — TELEPHONE ENCOUNTER
Patient called and wants mammogram order. Patient wants to be put on for mammovan. Order is not due until 5/20/2023. Order is pended please sign.

## 2023-03-01 ENCOUNTER — OFFICE VISIT (OUTPATIENT)
Dept: PRIMARY CARE CLINIC | Age: 53
End: 2023-03-01
Payer: MEDICAID

## 2023-03-01 VITALS
HEART RATE: 82 BPM | BODY MASS INDEX: 37.69 KG/M2 | WEIGHT: 248.7 LBS | HEIGHT: 68 IN | DIASTOLIC BLOOD PRESSURE: 76 MMHG | SYSTOLIC BLOOD PRESSURE: 128 MMHG | OXYGEN SATURATION: 98 % | TEMPERATURE: 97.4 F

## 2023-03-01 DIAGNOSIS — Z87.442 HISTORY OF NEPHROLITHIASIS: ICD-10-CM

## 2023-03-01 DIAGNOSIS — H61.23 BILATERAL IMPACTED CERUMEN: ICD-10-CM

## 2023-03-01 DIAGNOSIS — R35.0 URINARY FREQUENCY: Primary | ICD-10-CM

## 2023-03-01 DIAGNOSIS — R30.0 DYSURIA: ICD-10-CM

## 2023-03-01 DIAGNOSIS — E83.52 HYPERCALCEMIA: ICD-10-CM

## 2023-03-01 DIAGNOSIS — E34.9 ELEVATED PARATHYROID HORMONE: ICD-10-CM

## 2023-03-01 DIAGNOSIS — R35.89 POLYURIA: ICD-10-CM

## 2023-03-01 LAB
BILIRUBIN, POC: NORMAL
BLOOD URINE, POC: NORMAL
CLARITY, POC: CLEAR
COLOR, POC: YELLOW
GLUCOSE URINE, POC: NORMAL
KETONES, POC: NORMAL
LEUKOCYTE EST, POC: NORMAL
NITRITE, POC: NORMAL
PH, POC: 7
PROTEIN, POC: NORMAL
SPECIFIC GRAVITY, POC: 1.02
UROBILINOGEN, POC: NORMAL

## 2023-03-01 PROCEDURE — 3078F DIAST BP <80 MM HG: CPT | Performed by: INTERNAL MEDICINE

## 2023-03-01 PROCEDURE — 99213 OFFICE O/P EST LOW 20 MIN: CPT | Performed by: INTERNAL MEDICINE

## 2023-03-01 PROCEDURE — 3017F COLORECTAL CA SCREEN DOC REV: CPT | Performed by: INTERNAL MEDICINE

## 2023-03-01 PROCEDURE — G8484 FLU IMMUNIZE NO ADMIN: HCPCS | Performed by: INTERNAL MEDICINE

## 2023-03-01 PROCEDURE — 1036F TOBACCO NON-USER: CPT | Performed by: INTERNAL MEDICINE

## 2023-03-01 PROCEDURE — G8427 DOCREV CUR MEDS BY ELIG CLIN: HCPCS | Performed by: INTERNAL MEDICINE

## 2023-03-01 PROCEDURE — G8417 CALC BMI ABV UP PARAM F/U: HCPCS | Performed by: INTERNAL MEDICINE

## 2023-03-01 PROCEDURE — 3074F SYST BP LT 130 MM HG: CPT | Performed by: INTERNAL MEDICINE

## 2023-03-01 PROCEDURE — 81002 URINALYSIS NONAUTO W/O SCOPE: CPT | Performed by: INTERNAL MEDICINE

## 2023-03-01 RX ORDER — CIPROFLOXACIN 500 MG/1
500 TABLET, FILM COATED ORAL 2 TIMES DAILY
Qty: 14 TABLET | Refills: 0 | Status: SHIPPED | OUTPATIENT
Start: 2023-03-01 | End: 2023-03-08

## 2023-03-01 SDOH — ECONOMIC STABILITY: HOUSING INSECURITY
IN THE LAST 12 MONTHS, WAS THERE A TIME WHEN YOU DID NOT HAVE A STEADY PLACE TO SLEEP OR SLEPT IN A SHELTER (INCLUDING NOW)?: NO

## 2023-03-01 SDOH — ECONOMIC STABILITY: FOOD INSECURITY: WITHIN THE PAST 12 MONTHS, THE FOOD YOU BOUGHT JUST DIDN'T LAST AND YOU DIDN'T HAVE MONEY TO GET MORE.: NEVER TRUE

## 2023-03-01 SDOH — ECONOMIC STABILITY: INCOME INSECURITY: HOW HARD IS IT FOR YOU TO PAY FOR THE VERY BASICS LIKE FOOD, HOUSING, MEDICAL CARE, AND HEATING?: NOT HARD AT ALL

## 2023-03-01 SDOH — ECONOMIC STABILITY: FOOD INSECURITY: WITHIN THE PAST 12 MONTHS, YOU WORRIED THAT YOUR FOOD WOULD RUN OUT BEFORE YOU GOT MONEY TO BUY MORE.: NEVER TRUE

## 2023-03-01 ASSESSMENT — PATIENT HEALTH QUESTIONNAIRE - PHQ9
SUM OF ALL RESPONSES TO PHQ QUESTIONS 1-9: 0
SUM OF ALL RESPONSES TO PHQ QUESTIONS 1-9: 0
2. FEELING DOWN, DEPRESSED OR HOPELESS: 0
SUM OF ALL RESPONSES TO PHQ9 QUESTIONS 1 & 2: 0
1. LITTLE INTEREST OR PLEASURE IN DOING THINGS: 0
SUM OF ALL RESPONSES TO PHQ QUESTIONS 1-9: 0
SUM OF ALL RESPONSES TO PHQ QUESTIONS 1-9: 0

## 2023-03-01 ASSESSMENT — ANXIETY QUESTIONNAIRES
4. TROUBLE RELAXING: 0
GAD7 TOTAL SCORE: 0
1. FEELING NERVOUS, ANXIOUS, OR ON EDGE: 0
5. BEING SO RESTLESS THAT IT IS HARD TO SIT STILL: 0
3. WORRYING TOO MUCH ABOUT DIFFERENT THINGS: 0
7. FEELING AFRAID AS IF SOMETHING AWFUL MIGHT HAPPEN: 0
IF YOU CHECKED OFF ANY PROBLEMS ON THIS QUESTIONNAIRE, HOW DIFFICULT HAVE THESE PROBLEMS MADE IT FOR YOU TO DO YOUR WORK, TAKE CARE OF THINGS AT HOME, OR GET ALONG WITH OTHER PEOPLE: NOT DIFFICULT AT ALL
6. BECOMING EASILY ANNOYED OR IRRITABLE: 0
2. NOT BEING ABLE TO STOP OR CONTROL WORRYING: 0

## 2023-03-01 NOTE — PROGRESS NOTES
Chief Complaint   Patient presents with    Urinary Frequency     Pt is an acute visit with urinary frequency x1 week, and a POC urine done at this visit is negative. Pt is also c/o itching in ears bilat. HPI:  Patient is here complaining of increased frequency of urination associated with burning sensation for the last week getting progressively worse. She denies any fevers or chills or any nausea or vomiting. She admits to some flank pain specially on the right side and some suprapubic discomfort. She denies any hematuria. She has a history of bilateral nephrolithiasis with an x-ray that was done back in June 2022. Urine dipstick here in the office was negative. Patient is not sexually active. She is also complaining about itching of the left ear and on exam she has bilateral cerumen impaction worse on the right. She was advised to use Debrox over-the-counter and to come back after 5 days for ear flushing if she would like to. Past Medical History, Surgical History, and Family History has been reviewed and updated.     Review of Systems:  Constitutional:  No fever, no fatigue, no chills, no headaches, no weight change  Dermatology:  No rash, no mole, no dry or sensitive skin  ENT:  No cough, no sore throat, no sinus pain, no runny nose, no ear pain  Cardiology:  No chest pain, no palpitations, no leg edema, no shortness of breath, no PND  Gastroenterology:  No dysphagia, no abdominal pain, no nausea, no vomiting, no constipation, no diarrhea, no heartburn  Musculoskeletal:  No joint pain, no leg cramps, no back pain, no muscle aches  Respiratory:  No shortness of breath, no orthopnea, no wheezing, no MOE, no hemoptysis  Urology:  No blood in the urine, no urinary frequency, no urinary incontinence, no urinary urgency, no nocturia, no dysuria    Vitals:    03/01/23 1211   BP: 128/76   Pulse: 82   Temp: 97.4 °F (36.3 °C)   TempSrc: Temporal   SpO2: 98%   Weight: 248 lb 11.2 oz (112.8 kg)   Height: 5' 8\" (1.727 m)       General:  Patient alert and oriented x 3, NAD, pleasant  HEENT:  Atraumatic, normocephalic, PERRLA, EOMI, clear conjunctiva,, bilateral cerumen impaction worse on the right side, nose-clear, throat - no erythema  Neck:  Supple, no goiter, no carotid bruits, no LAD  Lungs:  CTA   Heart:  RRR, no murmurs, gallops or rubs  Abdomen:  Soft/nt/with mild discomfort on the suprapubic area and the right flank.  + bowel sounds  Lymph node examination: unremarkable  Neurological exam : unremarkable  Extremities:  No clubbing, cyanosis or edema  Skin: unremarkable    Cholesterol, Total   Date Value Ref Range Status   05/17/2022 198 0 - 199 mg/dL Final     Triglycerides   Date Value Ref Range Status   05/17/2022 123 0 - 149 mg/dL Final     HDL   Date Value Ref Range Status   05/17/2022 40 >40 mg/dL Final     LDL Calculated   Date Value Ref Range Status   05/17/2022 133 (H) 0 - 99 mg/dL Final     VLDL Cholesterol Calculated   Date Value Ref Range Status   05/17/2022 25 mg/dL Final     Sodium   Date Value Ref Range Status   08/01/2022 141 132 - 146 mmol/L Final     Potassium   Date Value Ref Range Status   08/01/2022 4.6 3.5 - 5.0 mmol/L Final     Chloride   Date Value Ref Range Status   08/01/2022 106 98 - 107 mmol/L Final     CO2   Date Value Ref Range Status   08/01/2022 25 22 - 29 mmol/L Final     BUN   Date Value Ref Range Status   08/01/2022 12 6 - 20 mg/dL Final     Creatinine   Date Value Ref Range Status   08/01/2022 0.8 0.5 - 1.0 mg/dL Final     Glucose   Date Value Ref Range Status   08/01/2022 97 74 - 99 mg/dL Final     Calcium   Date Value Ref Range Status   08/01/2022 10.0 8.6 - 10.2 mg/dL Final     Total Protein   Date Value Ref Range Status   05/17/2022 6.9 6.4 - 8.3 g/dL Final     Albumin   Date Value Ref Range Status   05/17/2022 4.7 3.5 - 5.2 g/dL Final     Total Bilirubin   Date Value Ref Range Status   05/17/2022 1.1 0.0 - 1.2 mg/dL Final     Alkaline Phosphatase   Date Value Ref Range Status   05/17/2022 64 35 - 104 U/L Final     AST   Date Value Ref Range Status   05/17/2022 21 0 - 31 U/L Final     ALT   Date Value Ref Range Status   05/17/2022 16 0 - 32 U/L Final     GFR Non-   Date Value Ref Range Status   08/01/2022 >60 >=60 mL/min/1.73 Final     Comment:     Chronic Kidney Disease: less than 60 ml/min/1.73 sq.m. Kidney Failure: less than 15 ml/min/1.73 sq.m. Results valid for patients 18 years and older. GFR    Date Value Ref Range Status   08/01/2022 >60  Final        No results found. Assessment/Plan:      Outpatient Encounter Medications as of 3/1/2023   Medication Sig Dispense Refill    ciprofloxacin (CIPRO) 500 MG tablet Take 1 tablet by mouth 2 times daily for 7 days 14 tablet 0    vitamin D (ERGOCALCIFEROL) 1.25 MG (59739 UT) CAPS capsule Take 1 capsule by mouth once a week 12 capsule 1     No facility-administered encounter medications on file as of 3/1/2023. Luis Simons was seen today for urinary frequency. Diagnoses and all orders for this visit:    Urinary frequency  -     POCT Urinalysis no Micro  -     ciprofloxacin (CIPRO) 500 MG tablet; Take 1 tablet by mouth 2 times daily for 7 days  -     XR ABDOMEN (KUB) (SINGLE AP VIEW); Future    History of nephrolithiasis    Hypercalcemia    Elevated parathyroid hormone    Polyuria  -     XR ABDOMEN (KUB) (SINGLE AP VIEW); Future    Dysuria  -     ciprofloxacin (CIPRO) 500 MG tablet; Take 1 tablet by mouth 2 times daily for 7 days  -     XR ABDOMEN (KUB) (SINGLE AP VIEW); Future    Bilateral impacted cerumen       There are no Patient Instructions on file for this visit. On this date 3/1/2023 I have spent 27 minutes reviewing previous notes, test results and face to face with the patient discussing the diagnosis and importance of compliance with the treatment plan as well as documenting on the day of the visit.       Chuck Chong MD   3/1/23

## 2023-03-02 ENCOUNTER — HOSPITAL ENCOUNTER (OUTPATIENT)
Age: 53
Discharge: HOME | End: 2023-03-02
Payer: MEDICAID

## 2023-03-02 ENCOUNTER — HOSPITAL ENCOUNTER (OUTPATIENT)
Dept: GENERAL RADIOLOGY | Age: 53
Discharge: HOME | End: 2023-03-04
Payer: MEDICAID

## 2023-03-02 DIAGNOSIS — R35.89 POLYURIA: ICD-10-CM

## 2023-03-02 DIAGNOSIS — R35.0 URINARY FREQUENCY: ICD-10-CM

## 2023-03-02 DIAGNOSIS — R30.0 DYSURIA: ICD-10-CM

## 2023-03-02 PROCEDURE — 74018 RADEX ABDOMEN 1 VIEW: CPT

## 2023-03-09 DIAGNOSIS — E34.9 ELEVATED PARATHYROID HORMONE: ICD-10-CM

## 2023-03-09 DIAGNOSIS — E55.9 VITAMIN D DEFICIENCY: ICD-10-CM

## 2023-03-09 DIAGNOSIS — E83.52 HYPERCALCEMIA: ICD-10-CM

## 2023-03-09 LAB
ALBUMIN SERPL-MCNC: 4.4 G/DL (ref 3.5–5.2)
ALP BLD-CCNC: 74 U/L (ref 35–104)
ALT SERPL-CCNC: 15 U/L (ref 0–32)
ANION GAP SERPL CALCULATED.3IONS-SCNC: 10 MMOL/L (ref 7–16)
AST SERPL-CCNC: 17 U/L (ref 0–31)
BILIRUB SERPL-MCNC: 0.9 MG/DL (ref 0–1.2)
BUN BLDV-MCNC: 13 MG/DL (ref 6–20)
CALCIUM SERPL-MCNC: 10 MG/DL (ref 8.6–10.2)
CHLORIDE BLD-SCNC: 104 MMOL/L (ref 98–107)
CO2: 25 MMOL/L (ref 22–29)
CREAT SERPL-MCNC: 0.7 MG/DL (ref 0.5–1)
GFR SERPL CREATININE-BSD FRML MDRD: >60 ML/MIN/1.73
GLUCOSE BLD-MCNC: 101 MG/DL (ref 74–99)
POTASSIUM SERPL-SCNC: 4.6 MMOL/L (ref 3.5–5)
SODIUM BLD-SCNC: 139 MMOL/L (ref 132–146)
TOTAL PROTEIN: 6.7 G/DL (ref 6.4–8.3)
VITAMIN D 25-HYDROXY: 31 NG/ML (ref 30–100)

## 2023-03-10 LAB — PARATHYROID HORMONE INTACT: 81 PG/ML (ref 15–65)

## 2023-03-15 ENCOUNTER — OFFICE VISIT (OUTPATIENT)
Dept: PRIMARY CARE CLINIC | Age: 53
End: 2023-03-15
Payer: MEDICAID

## 2023-03-15 VITALS
HEIGHT: 68 IN | OXYGEN SATURATION: 95 % | BODY MASS INDEX: 37.02 KG/M2 | DIASTOLIC BLOOD PRESSURE: 78 MMHG | WEIGHT: 244.3 LBS | SYSTOLIC BLOOD PRESSURE: 132 MMHG | TEMPERATURE: 98 F | HEART RATE: 85 BPM

## 2023-03-15 DIAGNOSIS — M54.32 BILATERAL SCIATICA: ICD-10-CM

## 2023-03-15 DIAGNOSIS — H61.23 BILATERAL IMPACTED CERUMEN: ICD-10-CM

## 2023-03-15 DIAGNOSIS — E66.9 CLASS 2 OBESITY WITH BODY MASS INDEX (BMI) OF 37.0 TO 37.9 IN ADULT, UNSPECIFIED OBESITY TYPE, UNSPECIFIED WHETHER SERIOUS COMORBIDITY PRESENT: ICD-10-CM

## 2023-03-15 DIAGNOSIS — E55.9 VITAMIN D DEFICIENCY: ICD-10-CM

## 2023-03-15 DIAGNOSIS — M54.31 BILATERAL SCIATICA: ICD-10-CM

## 2023-03-15 DIAGNOSIS — M54.16 LUMBAR RADICULOPATHY: Primary | ICD-10-CM

## 2023-03-15 PROBLEM — M54.30 SCIATICA, UNSPECIFIED SIDE: Status: ACTIVE | Noted: 2023-03-15

## 2023-03-15 PROBLEM — E66.812 CLASS 2 OBESITY IN ADULT: Status: ACTIVE | Noted: 2023-03-15

## 2023-03-15 PROCEDURE — 1036F TOBACCO NON-USER: CPT | Performed by: INTERNAL MEDICINE

## 2023-03-15 PROCEDURE — 3078F DIAST BP <80 MM HG: CPT | Performed by: INTERNAL MEDICINE

## 2023-03-15 PROCEDURE — G8417 CALC BMI ABV UP PARAM F/U: HCPCS | Performed by: INTERNAL MEDICINE

## 2023-03-15 PROCEDURE — 3075F SYST BP GE 130 - 139MM HG: CPT | Performed by: INTERNAL MEDICINE

## 2023-03-15 PROCEDURE — G8484 FLU IMMUNIZE NO ADMIN: HCPCS | Performed by: INTERNAL MEDICINE

## 2023-03-15 PROCEDURE — G8427 DOCREV CUR MEDS BY ELIG CLIN: HCPCS | Performed by: INTERNAL MEDICINE

## 2023-03-15 PROCEDURE — 3017F COLORECTAL CA SCREEN DOC REV: CPT | Performed by: INTERNAL MEDICINE

## 2023-03-15 PROCEDURE — 99214 OFFICE O/P EST MOD 30 MIN: CPT | Performed by: INTERNAL MEDICINE

## 2023-03-15 RX ORDER — GABAPENTIN 300 MG/1
300 CAPSULE ORAL NIGHTLY
Qty: 30 CAPSULE | Refills: 2 | Status: SHIPPED | OUTPATIENT
Start: 2023-03-15 | End: 2023-06-13

## 2023-03-15 NOTE — PROGRESS NOTES
Chief Complaint   Patient presents with    Follow-up     Pt is here as a 2 week F/U, and states she is feeling better at this visit. Pt states she has daily sciatica discomfort now, and she has had this for about 5 years. Labs on file for review from 3/9. HPI:  Patient is here for follow-up of acute urinary tract infection and cerumen impaction. Urinary tract infection completely resolved. She denies any dysuria or polyuria on any abdominal pain. She has been using the Debrox over-the-counter and that has resolved her cerumen bilaterally bilateral ears were checked today. Blood work was discussed with patient all appears within normal limits with decrease in PTH as we are correcting the vitamin D level which is now 31. Patient was instructed to discontinue the prescription vitamin D and start vitamin D 5000 international units daily over-the-counter    . Patient complains of low back pain radiating to bilateral lower extremities that has been there for years intermittent and she has been going to chiropractor but lately it has been getting significantly worse. She is describing the pain as worse when she sits for a while and then she tries to stand up she cannot actually stand up it takes her a few minutes for the back to straighten out and for the stiffness to loosen up. The pain is in the lower back radiating to the bilateral lower extremities all the way to the heels with tingling and numbness sensation sometimes. She had a CT scan of the abdomen and pelvis looking for her nephrolithiasis quite a while ago and that has shown lumbar radiculopathy with L2-L5 level with mild spondylosis. She has been trying ibuprofen and Aleve over-the-counter with partial improvement. She was counseled in length and we will start her on gabapentin 300 mg nightly and titrate the dose as needed to relieve her symptoms. She denies any shortness of breath urinary frequency or abdominal pain. .    Past Medical History, Surgical History, and Family History has been reviewed and updated. Review of Systems:  Constitutional:  No fever, no fatigue, no chills, no headaches, no weight change  Dermatology:  No rash, no mole, no dry or sensitive skin  ENT:  No cough, no sore throat, no sinus pain, no runny nose, no ear pain  Cardiology:  No chest pain, no palpitations, no leg edema, no shortness of breath, no PND  Gastroenterology:  No dysphagia, no abdominal pain, no nausea, no vomiting, no constipation, no diarrhea, no heartburn  Musculoskeletal:  No joint pain, no leg cramps, no back pain, no muscle aches  Respiratory:  No shortness of breath, no orthopnea, no wheezing, no MOE, no hemoptysis  Urology:  No blood in the urine, no urinary frequency, no urinary incontinence, no urinary urgency, no nocturia, no dysuria    Vitals:    03/15/23 1024   BP: 132/78   Pulse: 85   Temp: 98 °F (36.7 °C)   TempSrc: Temporal   SpO2: 95%   Weight: 244 lb 4.8 oz (110.8 kg)   Height: 5' 8\" (1.727 m)       General:  Patient alert and oriented x 3, NAD, pleasant, obese  HEENT:  Atraumatic, normocephalic, PERRLA, EOMI, clear conjunctiva, TMs clear, nose-clear, throat - no erythema  Neck:  Supple, no goiter, no carotid bruits, no LAD  Lungs:  CTA   Heart:  RRR, no murmurs, gallops or rubs  Abdomen:  Soft/nt/nd, + bowel sounds  Lymph node examination: unremarkable   lower back: Positive tenderness over the L4-L5 level. Straight leg raising is intact bilaterally. Range of motion of bilateral hips perfectly intact.   DTRs are -1 on bilateral lower extremities  Neurological exam : unremarkable  Extremities:  No clubbing, cyanosis or edema  Skin: unremarkable    Cholesterol, Total   Date Value Ref Range Status   05/17/2022 198 0 - 199 mg/dL Final     Triglycerides   Date Value Ref Range Status   05/17/2022 123 0 - 149 mg/dL Final     HDL   Date Value Ref Range Status   05/17/2022 40 >40 mg/dL Final     LDL Calculated   Date Value Ref Range Status   05/17/2022 133 (H) 0 - 99 mg/dL Final     VLDL Cholesterol Calculated   Date Value Ref Range Status   05/17/2022 25 mg/dL Final     Sodium   Date Value Ref Range Status   03/09/2023 139 132 - 146 mmol/L Final     Potassium   Date Value Ref Range Status   03/09/2023 4.6 3.5 - 5.0 mmol/L Final     Chloride   Date Value Ref Range Status   03/09/2023 104 98 - 107 mmol/L Final     CO2   Date Value Ref Range Status   03/09/2023 25 22 - 29 mmol/L Final     BUN   Date Value Ref Range Status   03/09/2023 13 6 - 20 mg/dL Final     Creatinine   Date Value Ref Range Status   03/09/2023 0.7 0.5 - 1.0 mg/dL Final     Glucose   Date Value Ref Range Status   03/09/2023 101 (H) 74 - 99 mg/dL Final     Calcium   Date Value Ref Range Status   03/09/2023 10.0 8.6 - 10.2 mg/dL Final     Total Protein   Date Value Ref Range Status   03/09/2023 6.7 6.4 - 8.3 g/dL Final     Albumin   Date Value Ref Range Status   03/09/2023 4.4 3.5 - 5.2 g/dL Final     Total Bilirubin   Date Value Ref Range Status   03/09/2023 0.9 0.0 - 1.2 mg/dL Final     Alkaline Phosphatase   Date Value Ref Range Status   03/09/2023 74 35 - 104 U/L Final     AST   Date Value Ref Range Status   03/09/2023 17 0 - 31 U/L Final     ALT   Date Value Ref Range Status   03/09/2023 15 0 - 32 U/L Final     Est, Glom Filt Rate   Date Value Ref Range Status   03/09/2023 >60 >=60 mL/min/1.73 Final     Comment:     Pediatric calculator link  https://www.kidney.org/professionals/kdoqi/gfr_calculatorped  Effective Oct 3, 2022  These results are not intended for use in patients  <18 years of age.  eGFR results are calculated without  a race factor using the 2021 CKD-EPI equation.  Careful  clinical correlation is recommended, particularly when  comparing to results calculated using previous equations.  The CKD-EPI equation is less accurate in patients with  extremes of muscle mass, extra-renal metabolism of  creatinine, excessive creatinine ingestion, or following  therapy that  affects renal tubular secretion. GFR    Date Value Ref Range Status   08/01/2022 >60  Final        XR ABDOMEN (KUB) (SINGLE AP VIEW)    Result Date: 3/2/2023  EXAMINATION: ONE SUPINE XRAY VIEW(S) OF THE ABDOMEN 3/2/2023 11:19 am COMPARISON: 06/21/2022 HISTORY: ORDERING SYSTEM PROVIDED HISTORY: Urinary frequency FINDINGS: Nonspecific bowel gas pattern without evidence of obstruction. No abnormal calcifications. No acute osseous abnormality. Diffuse colonic fecal retention. No evidence of bowel obstruction. Diffuse colonic fecal retention with significant stool burden. Assessment/Plan:      Outpatient Encounter Medications as of 3/15/2023   Medication Sig Dispense Refill    gabapentin (NEURONTIN) 300 MG capsule Take 1 capsule by mouth at bedtime for 90 days. Intended supply: 90 days 30 capsule 2    [DISCONTINUED] vitamin D (ERGOCALCIFEROL) 1.25 MG (25246 UT) CAPS capsule Take 1 capsule by mouth once a week 12 capsule 1     No facility-administered encounter medications on file as of 3/15/2023. Willis Burgess was seen today for follow-up. Diagnoses and all orders for this visit:    Lumbar radiculopathy    Vitamin D deficiency    Bilateral sciatica  -     gabapentin (NEURONTIN) 300 MG capsule; Take 1 capsule by mouth at bedtime for 90 days. Intended supply: 90 days    Bilateral impacted cerumen    Class 2 obesity with body mass index (BMI) of 37.0 to 37.9 in adult, unspecified obesity type, unspecified whether serious comorbidity present       Patient Instructions   Vit D3 5000 IU daily  Gabapentin 300 mg nightly    On this date 3/15/2023 I have spent 30 minutes reviewing previous notes, test results and face to face with the patient discussing the diagnosis and importance of compliance with the treatment plan as well as documenting on the day of the visit.       Sabina Winkler MD   3/15/23

## 2023-04-03 ENCOUNTER — HOSPITAL ENCOUNTER (OUTPATIENT)
Dept: CT IMAGING | Age: 53
Discharge: HOME | End: 2023-04-03
Payer: MEDICAID

## 2023-04-03 ENCOUNTER — OFFICE VISIT (OUTPATIENT)
Dept: PRIMARY CARE CLINIC | Age: 53
End: 2023-04-03
Payer: MEDICAID

## 2023-04-03 VITALS
SYSTOLIC BLOOD PRESSURE: 122 MMHG | OXYGEN SATURATION: 98 % | BODY MASS INDEX: 37.44 KG/M2 | DIASTOLIC BLOOD PRESSURE: 80 MMHG | HEIGHT: 68 IN | HEART RATE: 86 BPM | WEIGHT: 247 LBS | TEMPERATURE: 97 F

## 2023-04-03 DIAGNOSIS — I10 BENIGN ESSENTIAL HTN: ICD-10-CM

## 2023-04-03 DIAGNOSIS — N20.0 KIDNEY STONE: ICD-10-CM

## 2023-04-03 DIAGNOSIS — N20.0 KIDNEY STONE: Primary | ICD-10-CM

## 2023-04-03 DIAGNOSIS — R31.0 GROSS HEMATURIA: ICD-10-CM

## 2023-04-03 LAB
BILIRUBIN, POC: NORMAL
BLOOD URINE, POC: NORMAL
CLARITY, POC: CLEAR
COLOR, POC: YELLOW
GLUCOSE URINE, POC: NORMAL
KETONES, POC: NORMAL
LEUKOCYTE EST, POC: NORMAL
NITRITE, POC: NORMAL
PH, POC: 5.5
PROTEIN, POC: NORMAL
SPECIFIC GRAVITY, POC: 1.02
UROBILINOGEN, POC: 0.2

## 2023-04-03 PROCEDURE — 74177 CT ABD & PELVIS W/CONTRAST: CPT

## 2023-04-03 PROCEDURE — G8417 CALC BMI ABV UP PARAM F/U: HCPCS | Performed by: INTERNAL MEDICINE

## 2023-04-03 PROCEDURE — 81002 URINALYSIS NONAUTO W/O SCOPE: CPT | Performed by: INTERNAL MEDICINE

## 2023-04-03 PROCEDURE — 99213 OFFICE O/P EST LOW 20 MIN: CPT | Performed by: INTERNAL MEDICINE

## 2023-04-03 PROCEDURE — 6360000004 HC RX CONTRAST MEDICATION: Performed by: RADIOLOGY

## 2023-04-03 PROCEDURE — 3074F SYST BP LT 130 MM HG: CPT | Performed by: INTERNAL MEDICINE

## 2023-04-03 PROCEDURE — 3017F COLORECTAL CA SCREEN DOC REV: CPT | Performed by: INTERNAL MEDICINE

## 2023-04-03 PROCEDURE — 1036F TOBACCO NON-USER: CPT | Performed by: INTERNAL MEDICINE

## 2023-04-03 PROCEDURE — G8427 DOCREV CUR MEDS BY ELIG CLIN: HCPCS | Performed by: INTERNAL MEDICINE

## 2023-04-03 PROCEDURE — 3079F DIAST BP 80-89 MM HG: CPT | Performed by: INTERNAL MEDICINE

## 2023-04-03 RX ORDER — TAMSULOSIN HCL 0.4 MG
0.4 CAPSULE ORAL DAILY
Qty: 30 CAPSULE | Refills: 3
Start: 2023-04-03 | End: 2023-04-04 | Stop reason: SDUPTHER

## 2023-04-03 RX ORDER — CIPROFLOXACIN 500 MG/1
500 TABLET, FILM COATED ORAL 2 TIMES DAILY
Qty: 20 TABLET | Refills: 0 | Status: SHIPPED | OUTPATIENT
Start: 2023-04-03 | End: 2023-04-23

## 2023-04-03 RX ADMIN — IOPAMIDOL 70 ML: 755 INJECTION, SOLUTION INTRAVENOUS at 15:47

## 2023-04-03 NOTE — PROGRESS NOTES
Future    Gross hematuria    Benign essential HTN         There are no Patient Instructions on file for this visit. On this date 4/3/2023 I have spent 25 minutes reviewing previous notes, test results and face to face with the patient discussing the diagnosis and importance of compliance with the treatment plan as well as documenting on the day of the visit.       Wendy Laboy MD   4/3/23

## 2023-04-04 ENCOUNTER — TELEPHONE (OUTPATIENT)
Dept: PRIMARY CARE CLINIC | Age: 53
End: 2023-04-04

## 2023-04-04 DIAGNOSIS — N20.0 KIDNEY STONE: ICD-10-CM

## 2023-04-04 RX ORDER — TAMSULOSIN HCL 0.4 MG
0.4 CAPSULE ORAL DAILY
Qty: 30 CAPSULE | Refills: 3 | Status: SHIPPED | OUTPATIENT
Start: 2023-04-04

## 2023-04-04 RX ORDER — FLUTICASONE PROPIONATE 50 MCG
2 SPRAY, SUSPENSION (ML) NASAL DAILY
Qty: 48 G | Refills: 1 | Status: SHIPPED | OUTPATIENT
Start: 2023-04-04

## 2023-06-01 ENCOUNTER — OFFICE VISIT (OUTPATIENT)
Dept: PRIMARY CARE CLINIC | Age: 53
End: 2023-06-01
Payer: MEDICAID

## 2023-06-01 VITALS
TEMPERATURE: 97.1 F | BODY MASS INDEX: 37.07 KG/M2 | OXYGEN SATURATION: 98 % | WEIGHT: 244.6 LBS | HEIGHT: 68 IN | HEART RATE: 71 BPM | SYSTOLIC BLOOD PRESSURE: 132 MMHG | DIASTOLIC BLOOD PRESSURE: 80 MMHG

## 2023-06-01 DIAGNOSIS — E66.9 CLASS 2 OBESITY WITH BODY MASS INDEX (BMI) OF 37.0 TO 37.9 IN ADULT, UNSPECIFIED OBESITY TYPE, UNSPECIFIED WHETHER SERIOUS COMORBIDITY PRESENT: ICD-10-CM

## 2023-06-01 DIAGNOSIS — M54.32 BILATERAL SCIATICA: ICD-10-CM

## 2023-06-01 DIAGNOSIS — E34.9 ELEVATED PARATHYROID HORMONE: Primary | ICD-10-CM

## 2023-06-01 DIAGNOSIS — M54.31 BILATERAL SCIATICA: ICD-10-CM

## 2023-06-01 DIAGNOSIS — Z87.442 HISTORY OF NEPHROLITHIASIS: ICD-10-CM

## 2023-06-01 PROCEDURE — 3079F DIAST BP 80-89 MM HG: CPT | Performed by: INTERNAL MEDICINE

## 2023-06-01 PROCEDURE — 1036F TOBACCO NON-USER: CPT | Performed by: INTERNAL MEDICINE

## 2023-06-01 PROCEDURE — 3017F COLORECTAL CA SCREEN DOC REV: CPT | Performed by: INTERNAL MEDICINE

## 2023-06-01 PROCEDURE — G8427 DOCREV CUR MEDS BY ELIG CLIN: HCPCS | Performed by: INTERNAL MEDICINE

## 2023-06-01 PROCEDURE — 3075F SYST BP GE 130 - 139MM HG: CPT | Performed by: INTERNAL MEDICINE

## 2023-06-01 PROCEDURE — 99214 OFFICE O/P EST MOD 30 MIN: CPT | Performed by: INTERNAL MEDICINE

## 2023-06-01 PROCEDURE — G8417 CALC BMI ABV UP PARAM F/U: HCPCS | Performed by: INTERNAL MEDICINE

## 2023-06-01 RX ORDER — GABAPENTIN 100 MG/1
100 CAPSULE ORAL 2 TIMES DAILY
Qty: 180 CAPSULE | Refills: 0 | Status: SHIPPED | OUTPATIENT
Start: 2023-06-01 | End: 2023-08-30

## 2023-06-01 NOTE — PROGRESS NOTES
Chief Complaint   Patient presents with    Follow-up     Pt is a routine F/U, and passed a large kidney stone almost 2 months ago. HPI:  Patient is here for follow-up of  nephrolithiasis. According the patient she had passed the kidney stone that she had back in April  3 days later after her visit. She has seen a urologist and they are agreeing that we might be dealing with parathyroid adenoma. Her last parathyroid hormone is elevated at 81 despite normal calcium and vitamin D level. We will check parathyroid nuclear scan and refer to head and neck surgery if it is positive. Patient was counseled in length and answered all her questions. She is agreeable. She feels good today just complaining about mild tiredness and fatigue if she did the gabapentin later on the evening then she feelsfoggy in the morning she would like to decrease the dose. We will decrease the dose to 100 -200 mg nightly. She denies any current complaints. She is actually seeing gynecology in 2 weeks for pelvic exam she already has mammogram ordered    Past Medical History, Surgical History, and Family History has been reviewed and updated.     Review of Systems:  Constitutional:  No fever, no fatigue, no chills, no headaches, no weight change  Dermatology:  No rash, no mole, no dry or sensitive skin  ENT:  No cough, no sore throat, no sinus pain, no runny nose, no ear pain  Cardiology:  No chest pain, no palpitations, no leg edema, no shortness of breath, no PND  Gastroenterology:  No dysphagia, no abdominal pain, no nausea, no vomiting, no constipation, no diarrhea, no heartburn  Musculoskeletal:  No joint pain, no leg cramps, no back pain, no muscle aches  Respiratory:  No shortness of breath, no orthopnea, no wheezing, no MOE, no hemoptysis  Urology:  No blood in the urine, no urinary frequency, no urinary incontinence, no urinary urgency, no nocturia, no dysuria    Vitals:    06/01/23 1231   BP: 132/80   Pulse: 71   Temp:

## 2023-06-06 ENCOUNTER — HOSPITAL ENCOUNTER (OUTPATIENT)
Dept: MAMMOGRAPHY | Age: 53
Discharge: HOME | End: 2023-06-08
Payer: MEDICAID

## 2023-06-06 VITALS — BODY MASS INDEX: 36.37 KG/M2 | HEIGHT: 68 IN | WEIGHT: 240 LBS

## 2023-06-06 DIAGNOSIS — Z12.31 ENCOUNTER FOR SCREENING MAMMOGRAM FOR MALIGNANT NEOPLASM OF BREAST: ICD-10-CM

## 2023-06-06 PROCEDURE — 77063 BREAST TOMOSYNTHESIS BI: CPT

## 2023-07-21 ENCOUNTER — OFFICE VISIT (OUTPATIENT)
Dept: PRIMARY CARE CLINIC | Age: 53
End: 2023-07-21
Payer: MEDICAID

## 2023-07-21 VITALS
OXYGEN SATURATION: 99 % | SYSTOLIC BLOOD PRESSURE: 120 MMHG | BODY MASS INDEX: 37.27 KG/M2 | HEART RATE: 78 BPM | TEMPERATURE: 97.6 F | WEIGHT: 245.9 LBS | DIASTOLIC BLOOD PRESSURE: 78 MMHG | HEIGHT: 68 IN

## 2023-07-21 DIAGNOSIS — M54.16 LUMBAR RADICULOPATHY: ICD-10-CM

## 2023-07-21 DIAGNOSIS — Z87.442 HISTORY OF NEPHROLITHIASIS: ICD-10-CM

## 2023-07-21 DIAGNOSIS — H60.311 ACUTE DIFFUSE OTITIS EXTERNA OF RIGHT EAR: Primary | ICD-10-CM

## 2023-07-21 PROCEDURE — 99213 OFFICE O/P EST LOW 20 MIN: CPT | Performed by: INTERNAL MEDICINE

## 2023-07-21 PROCEDURE — 3017F COLORECTAL CA SCREEN DOC REV: CPT | Performed by: INTERNAL MEDICINE

## 2023-07-21 PROCEDURE — 1036F TOBACCO NON-USER: CPT | Performed by: INTERNAL MEDICINE

## 2023-07-21 PROCEDURE — 3074F SYST BP LT 130 MM HG: CPT | Performed by: INTERNAL MEDICINE

## 2023-07-21 PROCEDURE — G8417 CALC BMI ABV UP PARAM F/U: HCPCS | Performed by: INTERNAL MEDICINE

## 2023-07-21 PROCEDURE — G8427 DOCREV CUR MEDS BY ELIG CLIN: HCPCS | Performed by: INTERNAL MEDICINE

## 2023-07-21 PROCEDURE — 4130F TOPICAL PREP RX AOE: CPT | Performed by: INTERNAL MEDICINE

## 2023-07-21 PROCEDURE — 3078F DIAST BP <80 MM HG: CPT | Performed by: INTERNAL MEDICINE

## 2023-07-21 NOTE — PROGRESS NOTES
Weight: 245 lb 14.4 oz (111.5 kg)   Height: 5' 8\" (1.727 m)       General:  Patient alert and oriented x 3, NAD, pleasant  HEENT:  Atraumatic, normocephalic, PERRLA, EOMI, clear conjunctiva, TMs clear right ear canal itself is moderately erythematous with excoriation of skin and erythema worse on right side, nose-clear, throat - no erythema  Neck:  Supple, no goiter, no carotid bruits, no LAD  Lungs:  CTA   Heart:  RRR, no murmurs, gallops or rubs  Abdomen:  Soft/nt/nd, + bowel sounds  Lymph node examination: unremarkable  Neurological exam : unremarkable  Extremities:  No clubbing, cyanosis or edema  Skin: unremarkable    Cholesterol, Total   Date Value Ref Range Status   05/17/2022 198 0 - 199 mg/dL Final     Triglycerides   Date Value Ref Range Status   05/17/2022 123 0 - 149 mg/dL Final     HDL   Date Value Ref Range Status   05/17/2022 40 >40 mg/dL Final     LDL Calculated   Date Value Ref Range Status   05/17/2022 133 (H) 0 - 99 mg/dL Final     VLDL Cholesterol Calculated   Date Value Ref Range Status   05/17/2022 25 mg/dL Final     Sodium   Date Value Ref Range Status   03/09/2023 139 132 - 146 mmol/L Final     Potassium   Date Value Ref Range Status   03/09/2023 4.6 3.5 - 5.0 mmol/L Final     Chloride   Date Value Ref Range Status   03/09/2023 104 98 - 107 mmol/L Final     CO2   Date Value Ref Range Status   03/09/2023 25 22 - 29 mmol/L Final     BUN   Date Value Ref Range Status   03/09/2023 13 6 - 20 mg/dL Final     Creatinine   Date Value Ref Range Status   03/09/2023 0.7 0.5 - 1.0 mg/dL Final     Glucose   Date Value Ref Range Status   03/09/2023 101 (H) 74 - 99 mg/dL Final     Calcium   Date Value Ref Range Status   03/09/2023 10.0 8.6 - 10.2 mg/dL Final     Total Protein   Date Value Ref Range Status   03/09/2023 6.7 6.4 - 8.3 g/dL Final     Albumin   Date Value Ref Range Status   03/09/2023 4.4 3.5 - 5.2 g/dL Final     Total Bilirubin   Date Value Ref Range Status   03/09/2023 0.9 0.0 - 1.2 mg/dL

## 2023-07-29 DIAGNOSIS — E55.9 VITAMIN D DEFICIENCY: ICD-10-CM

## 2023-07-29 DIAGNOSIS — E83.52 HYPERCALCEMIA: ICD-10-CM

## 2023-07-31 RX ORDER — ERGOCALCIFEROL 1.25 MG/1
CAPSULE ORAL
Qty: 12 CAPSULE | Refills: 1 | OUTPATIENT
Start: 2023-07-31

## 2023-08-15 DIAGNOSIS — N20.0 KIDNEY STONE: ICD-10-CM

## 2023-08-16 RX ORDER — TAMSULOSIN HYDROCHLORIDE 0.4 MG/1
CAPSULE ORAL
Qty: 30 CAPSULE | Refills: 3 | OUTPATIENT
Start: 2023-08-16

## 2023-11-06 DIAGNOSIS — M54.31 BILATERAL SCIATICA: ICD-10-CM

## 2023-11-06 DIAGNOSIS — M54.32 BILATERAL SCIATICA: ICD-10-CM

## 2023-11-07 RX ORDER — GABAPENTIN 100 MG/1
100 CAPSULE ORAL 2 TIMES DAILY
Qty: 180 CAPSULE | Refills: 0 | Status: SHIPPED | OUTPATIENT
Start: 2023-11-07 | End: 2024-02-05

## 2024-01-15 ENCOUNTER — OFFICE VISIT (OUTPATIENT)
Dept: PRIMARY CARE CLINIC | Age: 54
End: 2024-01-15
Payer: MEDICAID

## 2024-01-15 VITALS
HEIGHT: 68 IN | WEIGHT: 243.6 LBS | HEART RATE: 78 BPM | BODY MASS INDEX: 36.92 KG/M2 | TEMPERATURE: 97 F | SYSTOLIC BLOOD PRESSURE: 130 MMHG | DIASTOLIC BLOOD PRESSURE: 82 MMHG | OXYGEN SATURATION: 99 %

## 2024-01-15 DIAGNOSIS — M54.16 LUMBAR RADICULOPATHY: ICD-10-CM

## 2024-01-15 DIAGNOSIS — M54.32 BILATERAL SCIATICA: ICD-10-CM

## 2024-01-15 DIAGNOSIS — R79.89 ELEVATED PARATHYROID HORMONE: Primary | ICD-10-CM

## 2024-01-15 DIAGNOSIS — M54.31 BILATERAL SCIATICA: ICD-10-CM

## 2024-01-15 PROCEDURE — 3075F SYST BP GE 130 - 139MM HG: CPT | Performed by: INTERNAL MEDICINE

## 2024-01-15 PROCEDURE — G8427 DOCREV CUR MEDS BY ELIG CLIN: HCPCS | Performed by: INTERNAL MEDICINE

## 2024-01-15 PROCEDURE — 3017F COLORECTAL CA SCREEN DOC REV: CPT | Performed by: INTERNAL MEDICINE

## 2024-01-15 PROCEDURE — G8417 CALC BMI ABV UP PARAM F/U: HCPCS | Performed by: INTERNAL MEDICINE

## 2024-01-15 PROCEDURE — 99213 OFFICE O/P EST LOW 20 MIN: CPT | Performed by: INTERNAL MEDICINE

## 2024-01-15 PROCEDURE — 3079F DIAST BP 80-89 MM HG: CPT | Performed by: INTERNAL MEDICINE

## 2024-01-15 PROCEDURE — G8484 FLU IMMUNIZE NO ADMIN: HCPCS | Performed by: INTERNAL MEDICINE

## 2024-01-15 PROCEDURE — 1036F TOBACCO NON-USER: CPT | Performed by: INTERNAL MEDICINE

## 2024-01-15 RX ORDER — FLUTICASONE PROPIONATE 50 MCG
2 SPRAY, SUSPENSION (ML) NASAL DAILY
Qty: 48 G | Refills: 1 | Status: SHIPPED | OUTPATIENT
Start: 2024-01-15

## 2024-01-15 RX ORDER — GABAPENTIN 100 MG/1
100 CAPSULE ORAL 2 TIMES DAILY
Qty: 180 CAPSULE | Refills: 1 | Status: SHIPPED | OUTPATIENT
Start: 2024-01-15 | End: 2024-07-13

## 2024-01-15 RX ORDER — GABAPENTIN 100 MG/1
100 CAPSULE ORAL 2 TIMES DAILY
Qty: 180 CAPSULE | Refills: 0 | Status: SHIPPED
Start: 2024-01-15 | End: 2024-01-15

## 2024-01-15 ASSESSMENT — ANXIETY QUESTIONNAIRES
IF YOU CHECKED OFF ANY PROBLEMS ON THIS QUESTIONNAIRE, HOW DIFFICULT HAVE THESE PROBLEMS MADE IT FOR YOU TO DO YOUR WORK, TAKE CARE OF THINGS AT HOME, OR GET ALONG WITH OTHER PEOPLE: NOT DIFFICULT AT ALL
6. BECOMING EASILY ANNOYED OR IRRITABLE: 0
7. FEELING AFRAID AS IF SOMETHING AWFUL MIGHT HAPPEN: 0
5. BEING SO RESTLESS THAT IT IS HARD TO SIT STILL: 0
2. NOT BEING ABLE TO STOP OR CONTROL WORRYING: 0
1. FEELING NERVOUS, ANXIOUS, OR ON EDGE: 0
GAD7 TOTAL SCORE: 0
4. TROUBLE RELAXING: 0
3. WORRYING TOO MUCH ABOUT DIFFERENT THINGS: 0

## 2024-01-15 ASSESSMENT — PATIENT HEALTH QUESTIONNAIRE - PHQ9
2. FEELING DOWN, DEPRESSED OR HOPELESS: 0
SUM OF ALL RESPONSES TO PHQ QUESTIONS 1-9: 0
SUM OF ALL RESPONSES TO PHQ QUESTIONS 1-9: 0
1. LITTLE INTEREST OR PLEASURE IN DOING THINGS: 0
SUM OF ALL RESPONSES TO PHQ9 QUESTIONS 1 & 2: 0
SUM OF ALL RESPONSES TO PHQ QUESTIONS 1-9: 0
SUM OF ALL RESPONSES TO PHQ QUESTIONS 1-9: 0

## 2024-01-15 NOTE — PROGRESS NOTES
Chief Complaint   Patient presents with    6 Month Follow-Up     Pt is a routine 6 month F/U, and states she is feeling good at this time.        HPI:  Patient is here for follow-up of lumbar radiculopathy low back pain and seasonal allergies.  She is doing very well without any new complaints.  She has started a new job but cannot state this secondary.  She appears to be in good spirits.  No blood work was done yet.  Patient denies any current complaint of any shortness of breath chest pains abdominal pains nausea vomiting or diarrhea.  She has been very stable with current gabapentin dose.    Past Medical History, Surgical History, and Family History has been reviewed and updated.    Review of Systems:  Constitutional:  No fever, no fatigue, no chills, no headaches, no weight change  Dermatology:  No rash, no mole, no dry or sensitive skin  ENT:  No cough, no sore throat, no sinus pain, no runny nose, no ear pain  Cardiology:  No chest pain, no palpitations, no leg edema, no shortness of breath, no PND  Gastroenterology:  No dysphagia, no abdominal pain, no nausea, no vomiting, no constipation, no diarrhea, no heartburn  Musculoskeletal:  No joint pain, no leg cramps, no back pain, no muscle aches  Respiratory:  No shortness of breath, no orthopnea, no wheezing, no MOE, no hemoptysis  Urology:  No blood in the urine, no urinary frequency, no urinary incontinence, no urinary urgency, no nocturia, no dysuria    Vitals:    01/15/24 1142   BP: 130/82   Pulse: 78   Temp: 97 °F (36.1 °C)   TempSrc: Temporal   SpO2: 99%   Weight: 110.5 kg (243 lb 9.6 oz)   Height: 1.727 m (5' 8\")       General:  Patient alert and oriented x 3, NAD, pleasant  HEENT:  Atraumatic, normocephalic, PERRLA, EOMI, clear conjunctiva, TMs clear, nose-clear, throat - no erythema  Neck:  Supple, no goiter, no carotid bruits, no LAD  Lungs:  CTA   Heart:  RRR, no murmurs, gallops or rubs  Abdomen:  Soft/nt/nd, + bowel sounds  Lymph node examination:

## 2024-05-07 ENCOUNTER — PATIENT MESSAGE (OUTPATIENT)
Dept: PRIMARY CARE CLINIC | Age: 54
End: 2024-05-07

## 2024-05-07 DIAGNOSIS — Z12.31 SCREENING MAMMOGRAM, ENCOUNTER FOR: Primary | ICD-10-CM

## 2024-05-07 NOTE — TELEPHONE ENCOUNTER
From: Priscila Pappas  To: Dr. Mariaa Vallejo  Sent: 5/7/2024 8:57 AM EDT  Subject: Mammogram prescription    Hello, I need a prescription for [facility] upcoming [facility]   Thank you,  Priscila

## 2024-05-14 DIAGNOSIS — Z12.31 BREAST CANCER SCREENING BY MAMMOGRAM: Primary | ICD-10-CM

## 2024-05-15 ENCOUNTER — TELEPHONE (OUTPATIENT)
Dept: PRIMARY CARE CLINIC | Age: 54
End: 2024-05-15

## 2024-06-05 ENCOUNTER — HOSPITAL ENCOUNTER (OUTPATIENT)
Dept: MAMMOGRAPHY | Age: 54
Discharge: HOME | End: 2024-06-07
Payer: COMMERCIAL

## 2024-06-05 VITALS — BODY MASS INDEX: 33.34 KG/M2 | WEIGHT: 220 LBS | HEIGHT: 68 IN

## 2024-06-05 DIAGNOSIS — Z12.31 SCREENING MAMMOGRAM, ENCOUNTER FOR: ICD-10-CM

## 2024-06-05 PROCEDURE — 77063 BREAST TOMOSYNTHESIS BI: CPT

## 2024-11-26 ENCOUNTER — APPOINTMENT (OUTPATIENT)
Dept: CT IMAGING | Age: 54
End: 2024-11-26
Payer: COMMERCIAL

## 2024-11-26 ENCOUNTER — HOSPITAL ENCOUNTER (EMERGENCY)
Age: 54
Discharge: HOME | End: 2024-11-26
Attending: EMERGENCY MEDICINE
Payer: COMMERCIAL

## 2024-11-26 VITALS
HEART RATE: 83 BPM | SYSTOLIC BLOOD PRESSURE: 142 MMHG | DIASTOLIC BLOOD PRESSURE: 72 MMHG | OXYGEN SATURATION: 95 % | RESPIRATION RATE: 16 BRPM | TEMPERATURE: 97.9 F

## 2024-11-26 DIAGNOSIS — R10.9 RIGHT FLANK PAIN: ICD-10-CM

## 2024-11-26 DIAGNOSIS — N20.1 CALCULUS OF URETER: Primary | ICD-10-CM

## 2024-11-26 LAB
ALBUMIN SERPL-MCNC: 4.3 G/DL (ref 3.5–5.2)
ALP SERPL-CCNC: 89 U/L (ref 35–104)
ALT SERPL-CCNC: 16 U/L (ref 0–32)
ANION GAP SERPL CALCULATED.3IONS-SCNC: 9 MMOL/L (ref 7–16)
AST SERPL-CCNC: 18 U/L (ref 0–31)
BASOPHILS # BLD: 0.03 K/UL (ref 0–0.2)
BASOPHILS NFR BLD: 1 % (ref 0–2)
BILIRUB SERPL-MCNC: 0.9 MG/DL (ref 0–1.2)
BILIRUB UR QL STRIP: NEGATIVE
BUN SERPL-MCNC: 13 MG/DL (ref 6–20)
CALCIUM SERPL-MCNC: 10.4 MG/DL (ref 8.6–10.2)
CHLORIDE SERPL-SCNC: 105 MMOL/L (ref 98–107)
CLARITY UR: CLEAR
CO2 SERPL-SCNC: 25 MMOL/L (ref 22–29)
COLOR UR: YELLOW
CREAT SERPL-MCNC: 0.7 MG/DL (ref 0.5–1)
EOSINOPHIL # BLD: 0.11 K/UL (ref 0.05–0.5)
EOSINOPHILS RELATIVE PERCENT: 2 % (ref 0–6)
ERYTHROCYTE [DISTWIDTH] IN BLOOD BY AUTOMATED COUNT: 13.1 % (ref 11.5–15)
GFR, ESTIMATED: >90 ML/MIN/1.73M2
GLUCOSE SERPL-MCNC: 109 MG/DL (ref 74–99)
GLUCOSE UR STRIP-MCNC: NEGATIVE MG/DL
HCT VFR BLD AUTO: 42.4 % (ref 34–48)
HGB BLD-MCNC: 14.1 G/DL (ref 11.5–15.5)
HGB UR QL STRIP.AUTO: ABNORMAL
IMM GRANULOCYTES # BLD AUTO: <0.03 K/UL (ref 0–0.58)
IMM GRANULOCYTES NFR BLD: 0 % (ref 0–5)
KETONES UR STRIP-MCNC: NEGATIVE MG/DL
LACTATE BLDV-SCNC: 0.8 MMOL/L (ref 0.5–2.2)
LEUKOCYTE ESTERASE UR QL STRIP: ABNORMAL
LIPASE SERPL-CCNC: 27 U/L (ref 13–60)
LYMPHOCYTES NFR BLD: 1.14 K/UL (ref 1.5–4)
LYMPHOCYTES RELATIVE PERCENT: 20 % (ref 20–42)
MCH RBC QN AUTO: 29.6 PG (ref 26–35)
MCHC RBC AUTO-ENTMCNC: 33.3 G/DL (ref 32–34.5)
MCV RBC AUTO: 89.1 FL (ref 80–99.9)
MONOCYTES NFR BLD: 0.45 K/UL (ref 0.1–0.95)
MONOCYTES NFR BLD: 8 % (ref 2–12)
NEUTROPHILS NFR BLD: 70 % (ref 43–80)
NEUTS SEG NFR BLD: 4 K/UL (ref 1.8–7.3)
NITRITE UR QL STRIP: NEGATIVE
PH UR STRIP: 6 [PH] (ref 5–9)
PLATELET # BLD AUTO: 282 K/UL (ref 130–450)
PMV BLD AUTO: 10.7 FL (ref 7–12)
POTASSIUM SERPL-SCNC: 4.1 MMOL/L (ref 3.5–5)
PROT SERPL-MCNC: 6.8 G/DL (ref 6.4–8.3)
PROT UR STRIP-MCNC: NEGATIVE MG/DL
RBC # BLD AUTO: 4.76 M/UL (ref 3.5–5.5)
RBC #/AREA URNS HPF: ABNORMAL /HPF
SODIUM SERPL-SCNC: 139 MMOL/L (ref 132–146)
SP GR UR STRIP: <1.005 (ref 1–1.03)
UROBILINOGEN UR STRIP-ACNC: 0.2 EU/DL (ref 0–1)
WBC #/AREA URNS HPF: ABNORMAL /HPF
WBC OTHER # BLD: 5.7 K/UL (ref 4.5–11.5)

## 2024-11-26 PROCEDURE — 83690 ASSAY OF LIPASE: CPT

## 2024-11-26 PROCEDURE — 96374 THER/PROPH/DIAG INJ IV PUSH: CPT

## 2024-11-26 PROCEDURE — 83605 ASSAY OF LACTIC ACID: CPT

## 2024-11-26 PROCEDURE — 81001 URINALYSIS AUTO W/SCOPE: CPT

## 2024-11-26 PROCEDURE — 96375 TX/PRO/DX INJ NEW DRUG ADDON: CPT

## 2024-11-26 PROCEDURE — 6360000002 HC RX W HCPCS: Performed by: EMERGENCY MEDICINE

## 2024-11-26 PROCEDURE — 99284 EMERGENCY DEPT VISIT MOD MDM: CPT

## 2024-11-26 PROCEDURE — 80053 COMPREHEN METABOLIC PANEL: CPT

## 2024-11-26 PROCEDURE — 74176 CT ABD & PELVIS W/O CONTRAST: CPT

## 2024-11-26 PROCEDURE — 6370000000 HC RX 637 (ALT 250 FOR IP): Performed by: EMERGENCY MEDICINE

## 2024-11-26 PROCEDURE — 85025 COMPLETE CBC W/AUTO DIFF WBC: CPT

## 2024-11-26 RX ORDER — TAMSULOSIN HYDROCHLORIDE 0.4 MG/1
0.4 CAPSULE ORAL DAILY
Qty: 14 CAPSULE | Refills: 0 | Status: SHIPPED | OUTPATIENT
Start: 2024-11-26 | End: 2024-12-10

## 2024-11-26 RX ORDER — KETOROLAC TROMETHAMINE 10 MG/1
10 TABLET, FILM COATED ORAL EVERY 6 HOURS PRN
Qty: 20 TABLET | Refills: 0 | Status: SHIPPED | OUTPATIENT
Start: 2024-11-26 | End: 2024-12-01

## 2024-11-26 RX ORDER — KETOROLAC TROMETHAMINE 30 MG/ML
30 INJECTION, SOLUTION INTRAMUSCULAR; INTRAVENOUS ONCE
Status: COMPLETED | OUTPATIENT
Start: 2024-11-26 | End: 2024-11-26

## 2024-11-26 RX ORDER — TAMSULOSIN HYDROCHLORIDE 0.4 MG/1
0.4 CAPSULE ORAL ONCE
Status: COMPLETED | OUTPATIENT
Start: 2024-11-26 | End: 2024-11-26

## 2024-11-26 RX ORDER — OXYCODONE AND ACETAMINOPHEN 5; 325 MG/1; MG/1
1 TABLET ORAL EVERY 6 HOURS PRN
Qty: 12 TABLET | Refills: 0 | Status: SHIPPED | OUTPATIENT
Start: 2024-11-26 | End: 2024-11-29

## 2024-11-26 RX ORDER — ONDANSETRON 4 MG/1
4 TABLET, FILM COATED ORAL EVERY 8 HOURS PRN
Qty: 12 TABLET | Refills: 0 | Status: SHIPPED | OUTPATIENT
Start: 2024-11-26 | End: 2024-12-01

## 2024-11-26 RX ORDER — ONDANSETRON 2 MG/ML
4 INJECTION INTRAMUSCULAR; INTRAVENOUS ONCE
Status: COMPLETED | OUTPATIENT
Start: 2024-11-26 | End: 2024-11-26

## 2024-11-26 RX ADMIN — ONDANSETRON 4 MG: 2 INJECTION INTRAMUSCULAR; INTRAVENOUS at 08:51

## 2024-11-26 RX ADMIN — TAMSULOSIN HYDROCHLORIDE 0.4 MG: 0.4 CAPSULE ORAL at 09:01

## 2024-11-26 RX ADMIN — KETOROLAC TROMETHAMINE 30 MG: 30 INJECTION, SOLUTION INTRAMUSCULAR at 08:51

## 2024-11-26 ASSESSMENT — PAIN SCALES - GENERAL: PAINLEVEL_OUTOF10: 5

## 2024-11-26 ASSESSMENT — PAIN - FUNCTIONAL ASSESSMENT: PAIN_FUNCTIONAL_ASSESSMENT: 0-10

## 2024-11-26 NOTE — DISCHARGE INSTRUCTIONS
CT ABDOMEN PELVIS WO CONTRAST Additional Contrast? None   Final Result   1. Proximal obstructing left UPJ stone measuring 8 x 9 mm.   2. Nonobstructing right nephrolithiasis.   3. Small hiatal hernia.   4. Sigmoid diverticulosis.   5. Mild-to-moderate colonic stool burden.

## 2024-11-28 NOTE — ED PROVIDER NOTES
[facility] EMERGENCY DEPARTMENT  EMERGENCY DEPARTMENT ENCOUNTER        Pt Name: Priscila Pappas  MRN: [id number]  Birthdate 1970  Date of evaluation: 11/26/2024  Provider: Harinder López DO  PCP: Mariaa Vallejo MD  Note Started: 9:23 PM EST 11/27/24    CHIEF COMPLAINT       Chief Complaint   Patient presents with    Flank Pain     R sided flank pain. Blood in urine since yesterday. Hx of kidney stones.        HISTORY OF PRESENT ILLNESS: 1 or more Elements   History From: Patient     Limitations to history : None    Priscila Pappas is a 54 y.o. female who presents right flank pain.  Is been intermittent moderate severity.  Complains of intermittent nausea denies vomiting.  Does have history of urolithiasis.  Believes she has had lithotripsy and stenting in the past.  Has not had one for quite some time.  No some blood in her urine.  Presents for further evaluation treated    Nursing Notes were all reviewed and agreed with or any disagreements were addressed in the HPI.      REVIEW OF EXTERNAL NOTE :       Primary care note from 1/15/2024 was seen for lumbar pain    REVIEW OF SYSTEMS :           Positives and Pertinent negatives as per HPI.     SURGICAL HISTORY     Past Surgical History:   Procedure Laterality Date    APPENDECTOMY         CURRENTMEDICATIONS       Discharge Medication List as of 11/26/2024 11:22 AM        CONTINUE these medications which have NOT CHANGED    Details   gabapentin (NEURONTIN) 100 MG capsule Take 1 capsule by mouth 2 times daily for 180 days., Disp-180 capsule, R-1Normal      Cholecalciferol (VITAMIN D3) 125 MCG (5000 UT) TABS Take 1 tablet by mouth dailyHistorical Med             ALLERGIES     Medrol [methylprednisolone] and Sulfa antibiotics    FAMILYHISTORY       Family History   Problem Relation Age of Onset    Colon Cancer Father 68        SOCIAL HISTORY       Social History     Tobacco Use    Smoking status: Never

## 2024-12-01 LAB
MICROORGANISM SPEC CULT: ABNORMAL
SPECIMEN DESCRIPTION: ABNORMAL

## 2025-05-16 ENCOUNTER — HOSPITAL ENCOUNTER (OUTPATIENT)
Dept: GENERAL RADIOLOGY | Age: 55
Discharge: HOME | End: 2025-05-18
Payer: COMMERCIAL

## 2025-05-16 ENCOUNTER — HOSPITAL ENCOUNTER (OUTPATIENT)
Age: 55
Discharge: HOME | End: 2025-05-18
Payer: COMMERCIAL

## 2025-05-16 DIAGNOSIS — N20.0 CALCULUS, KIDNEY: ICD-10-CM

## 2025-05-16 PROCEDURE — 74018 RADEX ABDOMEN 1 VIEW: CPT

## 2025-06-10 ENCOUNTER — HOSPITAL ENCOUNTER (OUTPATIENT)
Dept: MAMMOGRAPHY | Age: 55
Discharge: HOME | End: 2025-06-12
Payer: COMMERCIAL

## 2025-06-10 VITALS — BODY MASS INDEX: 34.53 KG/M2 | WEIGHT: 220 LBS | HEIGHT: 67 IN

## 2025-06-10 DIAGNOSIS — Z12.31 OTHER SCREENING MAMMOGRAM: ICD-10-CM

## 2025-06-10 PROCEDURE — 77063 BREAST TOMOSYNTHESIS BI: CPT

## 2025-06-12 ENCOUNTER — CLINICAL DOCUMENTATION (OUTPATIENT)
Dept: GENERAL RADIOLOGY | Age: 55
End: 2025-06-12

## 2025-06-12 NOTE — PROGRESS NOTES
Patient viewed clinical report for mammogram on My Chart.  Report sent to Dr. Vallejo, per patient request on Authorization to Release the results of a mammogram form.